# Patient Record
Sex: MALE | Race: WHITE | Employment: FULL TIME | URBAN - METROPOLITAN AREA
[De-identification: names, ages, dates, MRNs, and addresses within clinical notes are randomized per-mention and may not be internally consistent; named-entity substitution may affect disease eponyms.]

---

## 2018-06-05 ENCOUNTER — HOSPITAL ENCOUNTER (OUTPATIENT)
Dept: PHYSICAL THERAPY | Age: 53
Discharge: HOME | End: 2018-06-05
Payer: COMMERCIAL

## 2018-06-05 PROCEDURE — 97110 THERAPEUTIC EXERCISES: CPT | Performed by: PHYSICAL THERAPIST

## 2018-06-05 PROCEDURE — 97012 MECHANICAL TRACTION THERAPY: CPT | Performed by: PHYSICAL THERAPIST

## 2018-06-05 PROCEDURE — 97161 PT EVAL LOW COMPLEX 20 MIN: CPT | Performed by: PHYSICAL THERAPIST

## 2018-06-05 NOTE — PROGRESS NOTES
Ambulatory/Rehab Services H2 Model Falls Risk Assessment    Risk Factor Pts. ·   Confusion/Disorientation/Impulsivity  []    4 ·   Symptomatic Depression  []   2 ·   Altered Elimination  []   1 ·   Dizziness/Vertigo  []   1 ·   Gender (Male)  [x]   1 ·   Any administered antiepileptics (anticonvulsants):  []   2 ·   Any administered benzodiazepines:  []   1 ·   Visual Impairment (specify):  []   1 ·   Portable Oxygen Use  []   1 ·   Orthostatic ? BP  []   1 ·   History of Recent Falls (within 3 mos.)  []   5     Ability to Rise from Chair (choose one) Pts. ·   Ability to rise in a single movement  [x]   0 ·   Pushes up, successful in one attempt  []   1 ·   Multiple attempts, but successful  []   3 ·   Unable to rise without assistance  []   4   Total: (5 or greater = High Risk) 1     Falls Prevention Plan:   []                Physical Limitations to Exercise (specify):   []                Mobility Assistance Device (type):   []                Exercise/Equipment Adaptation (specify):    ©2010 [facility] [address] Patent #[id number].  Federal Law prohibits the replication, distribution or use without written permission from [facility]

## 2018-06-05 NOTE — THERAPY EVALUATION
Joana Dotson  : 1965 [address] at [facility] [address], [address], [facility], [id number] [place] [address] Road  [phone]   [phone]      Payor: [facility] / Plan: [facility] / Product Type: PPO /      OUTPATIENT PHYSICAL THERAPY:Initial Assessment and Daily Note 2018    ICD-10: Treatment Diagnosis: (M54.2) Cervicalgia; (M50.30) DDD Cervical; (M54.12) Cervical radiculopathy; (M62.81) muscle weakness  Precautions/Allergies:   Celebrex [celecoxib] and Pcn [penicillins]   Fall Risk Score: 1 (? 5 = High Risk)  MD Orders: Evaluate and Treat to include traction MEDICAL/REFERRING DIAGNOSIS:  Other cervical disc degeneration, unspecified cervical region [M50.30]  Radiculopathy, cervical region [M54.12]   DATE OF ONSET: 2018  REFERRING PHYSICIAN: Kaylee Krause,*  RETURN PHYSICIAN APPOINTMENT: After Therapy     INITIAL ASSESSMENT:  Mr. Jorje Russo presents with s/s consistent with the diagnosis of cervical DDD and radiculopathy. Upon evaluation he demonstrated restrictions in cervical spine AROM, as well as, intermittent R side pain and radiculopathy extending to his R AC joint. He reports pain is not constant, but can increase to a 6-7/10 at times. He scored a 12% disability rating on the NDI, however, he does wake at night due to pain. Pt will benefit from skilled PT to address the following deficits. PROBLEM LIST (Impacting functional limitations):  1. Decreased Strength  2. Increased Pain  3. Decreased Flexibility/Joint Mobility  4. Decreased Knowledge of Precautions  5. Decreased [place] with Home Exercise Program INTERVENTIONS PLANNED:  1. Cold  2. Electrical Stimulation  3. Heat  4. Home Exercise Program (HEP)  5. Manual Therapy  6. Neuromuscular Re-education/Strengthening  7. Range of Motion (ROM)  8. Therapeutic Exercise/Strengthening  9. Transcutaneous Electrical Nerve Stimulation (TENS)  10. Ultrasound (US)  11.  Mechanical Traction 12. Kinesiotaping   TREATMENT PLAN:  Effective Dates: 6/5/2018 TO 9/3/2018 (90 days). Frequency/Duration: 2 times a week for 90 Days  GOALS: (Goals have been discussed and agreed upon with patient.)  Short-Term Functional Goals: Time Frame: 4-6 weeks) (7-17-18)  1. Pt will be compliant and independent with HEP and demonstrating correct, erect posture without an increase in neck pain. 2. Pt will improve NDI score to 5/50 to increase pt's overall functional mobility. 3. Pt will subjectively report no longer waking during the night due to neck pain. 4. Pt to subjectively report a decrease in pain to 3/10 @ worst to increase pt's ease with driving and reading. 5. Pt will improve Cervical AROM to Cervical Flex: 75% and R Rotation 75% with pain 3/10 @ worst that occurs only on occasion. Discharge Goals: Time Frame: 8-12 weeks (9/3/18)  1. Pt will improve NDI score to < 4/50 to increase pt's overall functional mobility. 2. Pt will subjectively report little to no neck pain when turning his head L or R to allow him greater ease with performing his job in . 3. Pt will improve cervical AROM to > 80% all directions with min to no pain. 6. Pt will deny R superior shoulder pain with OH reaching/lifting. 4. Pt will subjectively report a decrease in pain to 1/10 @ worst to allow pt greater ease with reaching/working overhead. 5. Pt will be discharged from PT to [facility]. Rehabilitation Potential For Stated Goals: Good  Regarding [place] therapy, I certify that the treatment plan above will be carried out by a therapist or under their direction. Thank you for this referral,  Dipti Guerra, PT     Referring Physician Signature: Fang Fraser,*              Date                    The information in this section was collected on 6/5/18 (except where otherwise noted).   HISTORY:   History of Present Injury/Illness (Reason for Referral):  Pt reports he had no neck or R shoulder/UE pain until he was traveling out of town at with work and slept in a hotel. He states he woke up with a stiff neck, however, his pain proceeded to worsen later in the day when he got on a flight and lifted his carry on to put it in an overhead bin. He states at that time, his pain was a 7-8/10. He states his pain has improved, and is currently a 3/10. He reports turning his head to R and L increases his pain to a 6/10. He states MD ordered Vimovo (NSAID) that he will  from the pharmacy today. He denies taking any other meds or using headt/ice muscle rubs, massage, etc. He did purchase a new pillow, and feels it has helped some. Past Medical History/Comorbidities:   Mr. Emanuel He  has a past medical history of Hypertension; Obesity (BMI 30.0-34.9) (11/16/2016); Osteoarthritis; and White coat syndrome without diagnosis of hypertension. Mr. Emanuel He  has a past surgical history that includes pr total knee arthroplasty (Left, 01/2016). Social History/Living Environment:     Pt reports he lives in a 1 story home with his wife. He reports independence with ADLs. Prior Level of Function/Work/Activity:  Pt reports he still walks and plays golf for exercise. He denies that his pain has stopped him from doing so, He is in ,and drives ~ [id number], 5 days/week which is sometimes challenging on his painful days. Dominant Side:         RIGHT  Current Medications:       Current Outpatient Prescriptions:     naproxen sodium (ALEVE) 220 mg tablet, Take 440 mg by mouth as needed. Hold for surgery- pt reports last dose 11/16/16 @@ 0600, Disp: , Rfl:     acetaminophen (TYLENOL) 325 mg tablet, Take 650 mg by mouth every six (6) hours as needed for Pain., Disp: , Rfl:     atenolol-chlorthalidone (TENORETIC) 50-25 mg per tablet, Take 1 Tab by mouth every evening.  Indications: HYPERTENSION, Disp: , Rfl:    Date Last Reviewed:  6/5/18    Number of Personal Factors/Comorbidities that affect the Plan of Care:  0: LOW COMPLEXITY   EXAMINATION:   Observation/Orthostatic Postural Assessment:          Pt sits/stands with minimally forward head and rounded shoulders. He's somewhat guarded with cervical rotation AROM. Palpation:          Pt c/o increased pain with palpation to central and R side C5-C6, indicating the likelihood of a mechanical rotation and/or spurring present. Trigger points: none noted  Flexibility: Pec minor; UT; Levator Scap: min tightness throughout  Posture/Deformity:  Cervical AROM: % of normal motion in standing  Cervical spine Date:  6/5/18 Date:   Date:     Direction  Parameters Parameters Parameters   Flexion  50%; R pain     Extension  75% no pain     Rotation  R: 50% R pain  L: 75% no pain     Side bending  R: 75%  L:75% no pain     Shoulder Flex R:WFL  L:WFL     Shoulder ABD R:WFL  L:WFL     Shoulder ER/IR R:WFL  L:WFL  No shoulder pain with AROM today; in the past week, he's had R sup shoulder pain with OH activities         Strength Date:  6/5/18 Date:   Date:     Cervical/Shoulder  Parameters Parameters   Cervical Grossly 3+/5     Shoulder  Grossly 4/5 B        Myotomes: Normal and equal B throughout  Diaphragm (C4):  Deltoid/Biceps (C5):  Wrist Extensors (C6):  Triceps (C7):  Flexor Profundus (C8):    Sensation:  Normal and equal B throughout  Biceps (C5):   Alvarado Radial (C6-C7):  Alvarado Ulner (C8-T1):    Special Test/Function:  Compression: negative  Distraction:negative  Spurling's maneuver:negative  Accessory mobility:restricted C4-C6      Body Structures Involved:  1. Nerves  2. Bones  3. Joints  4. Muscles  5. Ligaments Body Functions Affected:  1. Sensory/Pain  2. Neuromusculoskeletal  3. Movement Related  Activities and Participation Affected:  1. Mobility  2. Self Care  3. Domestic Life  4.  Driving    Number of elements (examined above) that affect the Plan of Care:  1-2: LOW COMPLEXITY   CLINICAL PRESENTATION:    Presentation:  Stable and uncomplicated: LOW COMPLEXITY   CLINICAL DECISION MAKING:   Outcome Measure: Tool Used: Neck Disability Index (NDI)  Score:  Initial: 6/50=12% disability  Most Recent: X/50 (Date: -- )   Interpretation of Score: The Neck Disability Index is a revised form of the Oswestry Low Back Pain Index and is designed to measure the activities of daily living in person's with neck pain. Each section is scored on a 0-5 scale, 5 representing the greatest disability. The scores of each section are added together for a total score of 50. Score 0 1-10 11-20 21-30 31-40 41-49 50   Modifier CH CI CJ CK CL CM CN        Medical Necessity:   · Patient is expected to demonstrate progress in strength, range of motion, balance, coordination and functional technique to improve posture, pain and cervical AROM. [place] Dials Use of outcome tool(s) and clinical judgement create a POC that gives a: Clear prediction of patient's progress: LOW COMPLEXITY            TREATMENT:   (In addition to Assessment/Re-Assessment sessions the following treatments were rendered)  Pre-treatment Symptoms/Complaints:  Pt c/o central and R side cervical pain and stiffness. Pain: Initial:     3-4/10 Post Session:  3/10     THERAPEUTIC EXERCISE: (10 minutes):  Exercises per grid below to improve mobility, strength, balance and coordination. Required moderate visual, verbal, manual and tactile cues to promote proper body alignment, promote proper body posture, promote proper body mechanics and promote proper body breathing techniques. Progressed resistance, range, repetitions and complexity of movement as indicated. MECHANICAL TRACTION: (15 minutes): Traction was used due to the patient's cervical radiculopathy in order to relieve pain in or originating from his spine. Re-assessment was performed today (see above assessment section). Separate time was dedicated today for this re-assessment.   25minutes    Date:  6/5/18 Date:   Date:     Activity/Exercise Parameters Parameters Parameters   Posterior shoulder rolls 10x     Scapular retraction 10x     Shoulder shrugs 10x     UT stretch 2 x 30sec                 Education            Treatment/Session Assessment:  See above  · Response to Treatment:  Pt with improved R cervical rotation, as well as, decreased pain after mechanical traction. · Compliance with Program/Exercises: Will assess as treatment progresses. · Recommendations/Intent for next treatment session: \"Next visit will focus on advancements to more challenging activities, reduction in assistance provided and manual therapy/modaliites to improve joint mobility and soft tissue restrictions. \".     Future Appointments  Date Time Provider Renita Brush   6/8/2018 8:00 AM Leslie Salinas PT [facility]   6/12/2018 4:15 PM Leslie Salinas PT [facility]   6/15/2018 9:45 AM KAR MartinezOSRPT [facility]   6/19/2018 2:30 PM Karla ESPINOZAOSRPT [facility]   6/21/2018 4:00 PM Karla Hill [facility]       Total Treatment Duration: 50minutes  PT Patient Time In/Time Out  Time In: 1350  Time Out: [facility], PT

## 2018-06-08 ENCOUNTER — HOSPITAL ENCOUNTER (OUTPATIENT)
Dept: PHYSICAL THERAPY | Age: 53
Discharge: HOME | End: 2018-06-08
Payer: COMMERCIAL

## 2018-06-08 PROCEDURE — 97012 MECHANICAL TRACTION THERAPY: CPT | Performed by: PHYSICAL THERAPIST

## 2018-06-08 PROCEDURE — 97035 APP MDLTY 1+ULTRASOUND EA 15: CPT | Performed by: PHYSICAL THERAPIST

## 2018-06-08 PROCEDURE — 97140 MANUAL THERAPY 1/> REGIONS: CPT | Performed by: PHYSICAL THERAPIST

## 2018-06-08 NOTE — PROGRESS NOTES
Rommel Lizama  : 1965 [id number] at [address] [facility]. [facility] [address], [place], [address] [address]  [phone]   [phone]      Payor: [facility] / Plan: [address] / Product Type: PPO /      OUTPATIENT PHYSICAL [address] Espinoza Note 2018    ICD-10: Treatment Diagnosis: (M54.2) Cervicalgia; (M50.30) DDD Cervical; (M54.12) Cervical radiculopathy; (M62.81) muscle weakness  Precautions/Allergies:   Celebrex [celecoxib] and Pcn [penicillins]   Fall Risk Score: 1 (? 5 = High Risk)  MD Orders: Evaluate and Treat to include traction MEDICAL/REFERRING DIAGNOSIS:  Other cervical disc degeneration, unspecified cervical region [M50.30]  Radiculopathy, cervical region [M54.12]   DATE OF ONSET: 2018  REFERRING PHYSICIAN: Dru England,*  RETURN PHYSICIAN APPOINTMENT: After Therapy     INITIAL ASSESSMENT:  Mr. Carmela Daly presents with s/s consistent with the diagnosis of cervical DDD and radiculopathy. Upon evaluation he demonstrated restrictions in cervical spine AROM, as well as, intermittent R side pain and radiculopathy extending to his R AC joint. He reports pain is not constant, but can increase to a 6-7/10 at times. He scored a 12% disability rating on the NDI, however, he does wake at night due to pain. Pt will benefit from skilled PT to address the following deficits. PROBLEM LIST (Impacting functional limitations):  1. Decreased Strength  2. Increased Pain  3. Decreased Flexibility/Joint Mobility  4. Decreased Knowledge of Precautions  5. Decreased [place] with Home Exercise Program INTERVENTIONS PLANNED:  1. Cold  2. Electrical Stimulation  3. Heat  4. Home Exercise Program (HEP)  5. Manual Therapy  6. Neuromuscular Re-education/Strengthening  7. Range of Motion (ROM)  8. Therapeutic Exercise/Strengthening  9. Transcutaneous Electrical Nerve Stimulation (TENS)  10. Ultrasound (US)  11. Mechanical Traction   12.  Kinesiotaping TREATMENT PLAN:  Effective Dates: 6/5/2018 TO 9/3/2018 (90 days). Frequency/Duration: 2 times a week for 90 Days  GOALS: (Goals have been discussed and agreed upon with patient.)  Short-Term Functional Goals: Time Frame: 4-6 weeks) (7-17-18)  1. Pt will be compliant and independent with HEP and demonstrating correct, erect posture without an increase in neck pain. 2. Pt will improve NDI score to 5/50 to increase pt's overall functional mobility. 3. Pt will subjectively report no longer waking during the night due to neck pain. 4. Pt to subjectively report a decrease in pain to 3/10 @ worst to increase pt's ease with driving and reading. 5. Pt will improve Cervical AROM to Cervical Flex: 75% and R Rotation 75% with pain 3/10 @ worst that occurs only on occasion. Discharge Goals: Time Frame: 8-12 weeks (9/3/18)  1. Pt will improve NDI score to < 4/50 to increase pt's overall functional mobility. 2. Pt will subjectively report little to no neck pain when turning his head L or R to allow him greater ease with performing his job in . 3. Pt will improve cervical AROM to > 80% all directions with min to no pain. 6. Pt will deny R superior shoulder pain with OH reaching/lifting. 4. Pt will subjectively report a decrease in pain to 1/10 @ worst to allow pt greater ease with reaching/working overhead. 5. Pt will be discharged from PT to [facility]. Rehabilitation Potential For Stated Goals: Good  Regarding [place] therapy, I certify that the treatment plan above will be carried out by a therapist or under their direction. Thank you for this referral,  Kalyn Ignacio PT                 The information in this section was collected on 6/5/18 (except where otherwise noted). HISTORY:   History of Present Injury/Illness (Reason for Referral):  Pt reports he had no neck or R shoulder/UE pain until he was traveling out of town at with work and slept in a hotel.  He states he woke up with a stiff neck, however, his pain proceeded to worsen later in the day when he got on a flight and lifted his carry on to put it in an overhead bin. He states at that time, his pain was a 7-8/10. He states his pain has improved, and is currently a 3/10. He reports turning his head to R and L increases his pain to a 6/10. He states MD ordered Vimovo (NSAID) that he will  from the pharmacy today. He denies taking any other meds or using headt/ice muscle rubs, massage, etc. He did purchase a new pillow, and feels it has helped some. Past Medical History/Comorbidities:   Mr. Leona Wright  has a past medical history of Hypertension; Obesity (BMI 30.0-34.9) (11/16/2016); Osteoarthritis; and White coat syndrome without diagnosis of hypertension. Mr. Leona Wright  has a past surgical history that includes pr total knee arthroplasty (Left, 01/2016). Social History/Living Environment:     Pt reports he lives in a 1 story home with his wife. He reports independence with ADLs. Prior Level of Function/Work/Activity:  Pt reports he still walks and plays golf for exercise. He denies that his pain has stopped him from doing so, He is in ,and drives ~ [id number], 5 days/week which is sometimes challenging on his painful days. Dominant Side:         RIGHT  Current Medications:       Current Outpatient Prescriptions:     naproxen sodium (ALEVE) 220 mg tablet, Take 440 mg by mouth as needed. Hold for surgery- pt reports last dose 11/16/16 @@ 0600, Disp: , Rfl:     acetaminophen (TYLENOL) 325 mg tablet, Take 650 mg by mouth every six (6) hours as needed for Pain., Disp: , Rfl:     atenolol-chlorthalidone (TENORETIC) 50-25 mg per tablet, Take 1 Tab by mouth every evening. Indications: HYPERTENSION, Disp: , Rfl:    Date Last Reviewed:  6/5/18   EXAMINATION:   Observation/Orthostatic Postural Assessment:          Pt sits/stands with minimally forward head and rounded shoulders.  He's somewhat guarded with cervical rotation AROM.  Palpation:          Pt c/o increased pain with palpation to central and R side C5-C6, indicating the likelihood of a mechanical rotation and/or spurring present. Trigger points: none noted  Flexibility: Pec minor; UT; Levator Scap: min tightness throughout  Posture/Deformity:  Cervical AROM: % of normal motion in standing  Cervical spine Date:  6/5/18 Date:   Date:     Direction  Parameters Parameters Parameters   Flexion  50%; R pain     Extension  75% no pain     Rotation  R: 50% R pain  L: 75% no pain     Side bending  R: 75%  L:75% no pain     Shoulder Flex R:WFL  L:WFL     Shoulder ABD R:WFL  L:WFL     Shoulder ER/IR R:WFL  L:WFL  No shoulder pain with AROM today; in the past week, he's had R sup shoulder pain with OH activities         Strength Date:  6/5/18 Date:   Date:     Cervical/Shoulder  Parameters Parameters   Cervical Grossly 3+/5     Shoulder  Grossly 4/5 B        Myotomes: Normal and equal B throughout  Diaphragm (C4):  Deltoid/Biceps (C5):  Wrist Extensors (C6):  Triceps (C7):  Flexor Profundus (C8):    Sensation:  Normal and equal B throughout  Biceps (C5):   Alvarado Radial (C6-C7):  Alvarado Ulner (C8-T1):    Special Test/Function:  Compression: negative  Distraction:negative  Spurling's maneuver:negative  Accessory mobility:restricted C4-C6      Body Structures Involved:  1. Nerves  2. Bones  3. Joints  4. Muscles  5. Ligaments Body Functions Affected:  1. Sensory/Pain  2. Neuromusculoskeletal  3. Movement Related  Activities and Participation Affected:  1. Mobility  2. Self Care  3. Domestic Life  4. Driving   CLINICAL PRESENTATION:   CLINICAL DECISION MAKING:   Outcome Measure: Tool Used: Neck Disability Index (NDI)  Score:  Initial: 6/50=12% disability  Most Recent: X/50 (Date: -- )   Interpretation of Score: The Neck Disability Index is a revised form of the Oswestry Low Back Pain Index and is designed to measure the activities of daily living in person's with neck pain.   Each section is scored on a 0-5 scale, 5 representing the greatest disability. The scores of each section are added together for a total score of 50. Score 0 1-10 11-20 21-30 31-40 41-49 50   Modifier CH CI CJ CK CL CM CN        Medical Necessity:   · Patient is expected to demonstrate progress in strength, range of motion, balance, coordination and functional technique to improve posture, pain and cervical AROM. [place] Dials TREATMENT:   (In addition to Assessment/Re-Assessment sessions the following treatments were rendered)  Pre-treatment Symptoms/Complaints:  Pt reports significantly less cervical pain and improved mobility after cervical traction last visit. Pain: Initial:     2-3/10 Post Session:  1/10     THERAPEUTIC EXERCISE: (0 minutes):  Exercises per grid below to improve mobility, strength, balance and coordination. Required moderate visual, verbal, manual and tactile cues to promote proper body alignment, promote proper body posture, promote proper body mechanics and promote proper body breathing techniques. Progressed resistance, range, repetitions and complexity of movement as indicated. ULTRASOUND: (10minutes): @ 3.3MHz, 100%, 1.0w/cm2 to B cervical paraspinals, B UT, and B levator scap to aide with decreasing muscle tightness and improving tissue mobility,     MANUAL THERAPY: (15minutes): Consisting of STM/MFR to B cervical paraspinals, B UT, B levator scap to aide with improving tissue mobility while decreasing pain. MECHANICAL TRACTION: (20 minutes): Static @ 50% speed, 2 steps, (27#/12#). Traction was used due to the patient's cervical radiculopathy in order to relieve pain in or originating from his spine. KINESIOTAPING: (5minutes): to inhibit B cervical paraspinals, (\"Y\" strip 25% pull) and B UT (2 \"I\" strips 50% pull) to aide with decreasing muscle/tissue tightness/pain.       Date:  6/5/18 Date:   Date:     Activity/Exercise Parameters Parameters Parameters   Posterior shoulder rolls 10x     Scapular retraction 10x     Shoulder shrugs 10x     UT stretch 2 x 30sec                 Education            Treatment/Session Assessment:  Pt with tightness noted in R cervical paraspinals, R UT, and R levator scap. Palpably tender at R side C4-C5. · Response to Treatment:  Pt with improved R cervical rotation, as well as, decreased pain after mechanical traction, US, manual therapy and kinesiotaping. · Compliance with Program/Exercises: Pt reports daily compliance with above postural exercises and stretching. · Recommendations/Intent for next treatment session: Continue PT 2x/week to increase cervical AROM while decreasing pain and muscle tightness to improve pt's posture and overall functional mobility.      Future Appointments  Date Time Provider Renita Brush   6/12/2018 4:15 PM Damon Mckay PT [facility]   6/15/2018 9:45 AM Damon Mckay PT [facility]   6/19/2018 2:30 PM Karla Louis [facility]   6/21/2018 4:00 PM Karla Cabans [id number] [facility]       Total Treatment Duration: 50minutes  PT Patient Time In/Time Out  Time In: 0810  Time Out: Julita Sun, PT

## 2018-06-12 ENCOUNTER — HOSPITAL ENCOUNTER (OUTPATIENT)
Dept: PHYSICAL THERAPY | Age: 53
Discharge: HOME | End: 2018-06-12
Payer: COMMERCIAL

## 2018-06-12 PROCEDURE — 97012 MECHANICAL TRACTION THERAPY: CPT | Performed by: PHYSICAL THERAPIST

## 2018-06-12 PROCEDURE — 97140 MANUAL THERAPY 1/> REGIONS: CPT | Performed by: PHYSICAL THERAPIST

## 2018-06-12 PROCEDURE — 97035 APP MDLTY 1+ULTRASOUND EA 15: CPT | Performed by: PHYSICAL THERAPIST

## 2018-06-12 NOTE — PROGRESS NOTES
Jefry Gonzalez  : 1965 [address] at [address]. [facility] [address], [place], [address] [address]  [phone]   [phone]      Payor: [facility] / Plan: [address] / Product Type: PPO /      OUTPATIENT PHYSICAL [address] Note 2018    ICD-10: Treatment Diagnosis: (M54.2) Cervicalgia; (M50.30) DDD Cervical; (M54.12) Cervical radiculopathy; (M62.81) muscle weakness  Precautions/Allergies:   Celebrex [celecoxib] and Pcn [penicillins]   Fall Risk Score: 1 (? 5 = High Risk)  MD Orders: Evaluate and Treat to include traction MEDICAL/REFERRING DIAGNOSIS:  Other cervical disc degeneration, unspecified cervical region [M50.30]  Radiculopathy, cervical region [M54.12]   DATE OF ONSET: 2018  REFERRING PHYSICIAN: Fang Fraser,*  RETURN PHYSICIAN APPOINTMENT: After Therapy     INITIAL ASSESSMENT:  Mr. Leveda Landau presents with s/s consistent with the diagnosis of cervical DDD and radiculopathy. Upon evaluation he demonstrated restrictions in cervical spine AROM, as well as, intermittent R side pain and radiculopathy extending to his R AC joint. He reports pain is not constant, but can increase to a 6-7/10 at times. He scored a 12% disability rating on the NDI, however, he does wake at night due to pain. Pt will benefit from skilled PT to address the following deficits. PROBLEM LIST (Impacting functional limitations):  1. Decreased Strength  2. Increased Pain  3. Decreased Flexibility/Joint Mobility  4. Decreased Knowledge of Precautions  5. Decreased [place] with Home Exercise Program INTERVENTIONS PLANNED:  1. Cold  2. Electrical Stimulation  3. Heat  4. Home Exercise Program (HEP)  5. Manual Therapy  6. Neuromuscular Re-education/Strengthening  7. Range of Motion (ROM)  8. Therapeutic Exercise/Strengthening  9. Transcutaneous Electrical Nerve Stimulation (TENS)  10. Ultrasound (US)  11. Mechanical Traction   12.  Kinesiotaping TREATMENT PLAN:  Effective Dates: 6/5/2018 TO 9/3/2018 (90 days). Frequency/Duration: 2 times a week for 90 Days  GOALS: (Goals have been discussed and agreed upon with patient.)  Short-Term Functional Goals: Time Frame: 4-6 weeks) (7-17-18)  1. Pt will be compliant and independent with HEP and demonstrating correct, erect posture without an increase in neck pain. 2. Pt will improve NDI score to 5/50 to increase pt's overall functional mobility. 3. Pt will subjectively report no longer waking during the night due to neck pain. 4. Pt to subjectively report a decrease in pain to 3/10 @ worst to increase pt's ease with driving and reading. 5. Pt will improve Cervical AROM to Cervical Flex: 75% and R Rotation 75% with pain 3/10 @ worst that occurs only on occasion. Discharge Goals: Time Frame: 8-12 weeks (9/3/18)  1. Pt will improve NDI score to < 4/50 to increase pt's overall functional mobility. 2. Pt will subjectively report little to no neck pain when turning his head L or R to allow him greater ease with performing his job in . 3. Pt will improve cervical AROM to > 80% all directions with min to no pain. 6. Pt will deny R superior shoulder pain with OH reaching/lifting. 4. Pt will subjectively report a decrease in pain to 1/10 @ worst to allow pt greater ease with reaching/working overhead. 5. Pt will be discharged from PT to [facility]. Rehabilitation Potential For Stated Goals: Good  Regarding Leoncio Zendejasholly therapy, I certify that the treatment plan above will be carried out by a therapist or under their direction. Thank you for this referral,  Roz Iglesias PT                 The information in this section was collected on 6/5/18 (except where otherwise noted). HISTORY:   History of Present Injury/Illness (Reason for Referral):  Pt reports he had no neck or R shoulder/UE pain until he was traveling out of town at with work and slept in a hotel.  He states he woke up with a stiff neck, however, his pain proceeded to worsen later in the day when he got on a flight and lifted his carry on to put it in an overhead bin. He states at that time, his pain was a 7-8/10. He states his pain has improved, and is currently a 3/10. He reports turning his head to R and L increases his pain to a 6/10. He states MD ordered Vimovo (NSAID) that he will  from the pharmacy today. He denies taking any other meds or using headt/ice muscle rubs, massage, etc. He did purchase a new pillow, and feels it has helped some. Past Medical History/Comorbidities:   Mr. Adelita Foster  has a past medical history of Hypertension; Obesity (BMI 30.0-34.9) (11/16/2016); Osteoarthritis; and White coat syndrome without diagnosis of hypertension. Mr. Adelita Foster  has a past surgical history that includes pr total knee arthroplasty (Left, 01/2016). Social History/Living Environment:     Pt reports he lives in a 1 story home with his wife. He reports independence with ADLs. Prior Level of Function/Work/Activity:  Pt reports he still walks and plays golf for exercise. He denies that his pain has stopped him from doing so, He is in ,and drives ~ [address], 5 days/week which is sometimes challenging on his painful days. Dominant Side:         RIGHT  Current Medications:       Current Outpatient Prescriptions:     naproxen sodium (ALEVE) 220 mg tablet, Take 440 mg by mouth as needed. Hold for surgery- pt reports last dose 11/16/16 @@ 0600, Disp: , Rfl:     acetaminophen (TYLENOL) 325 mg tablet, Take 650 mg by mouth every six (6) hours as needed for Pain., Disp: , Rfl:     atenolol-chlorthalidone (TENORETIC) 50-25 mg per tablet, Take 1 Tab by mouth every evening. Indications: HYPERTENSION, Disp: , Rfl:    Date Last Reviewed:  6/5/18   EXAMINATION:   Observation/Orthostatic Postural Assessment:          Pt sits/stands with minimally forward head and rounded shoulders.  He's somewhat guarded with cervical rotation AROM.  Palpation:          Pt c/o increased pain with palpation to central and R side C5-C6, indicating the likelihood of a mechanical rotation and/or spurring present. Trigger points: none noted  Flexibility: Pec minor; UT; Levator Scap: min tightness throughout  Posture/Deformity:  Cervical AROM: % of normal motion in standing  Cervical spine Date:  6/5/18 Date:   Date:     Direction  Parameters Parameters Parameters   Flexion  50%; R pain     Extension  75% no pain     Rotation  R: 50% R pain  L: 75% no pain     Side bending  R: 75%  L:75% no pain     Shoulder Flex R:WFL  L:WFL     Shoulder ABD R:WFL  L:WFL     Shoulder ER/IR R:WFL  L:WFL  No shoulder pain with AROM today; in the past week, he's had R sup shoulder pain with OH activities         Strength Date:  6/5/18 Date:   Date:     Cervical/Shoulder  Parameters Parameters   Cervical Grossly 3+/5     Shoulder  Grossly 4/5 B        Myotomes: Normal and equal B throughout  Diaphragm (C4):  Deltoid/Biceps (C5):  Wrist Extensors (C6):  Triceps (C7):  Flexor Profundus (C8):    Sensation:  Normal and equal B throughout  Biceps (C5):   Alvarado Radial (C6-C7):  Alvarado Ulner (C8-T1):    Special Test/Function:  Compression: negative  Distraction:negative  Spurling's maneuver:negative  Accessory mobility:restricted C4-C6      Body Structures Involved:  1. Nerves  2. Bones  3. Joints  4. Muscles  5. Ligaments Body Functions Affected:  1. Sensory/Pain  2. Neuromusculoskeletal  3. Movement Related  Activities and Participation Affected:  1. Mobility  2. Self Care  3. Domestic Life  4. Driving   CLINICAL PRESENTATION:   CLINICAL DECISION MAKING:   Outcome Measure: Tool Used: Neck Disability Index (NDI)  Score:  Initial: 6/50=12% disability  Most Recent: X/50 (Date: -- )   Interpretation of Score: The Neck Disability Index is a revised form of the Oswestry Low Back Pain Index and is designed to measure the activities of daily living in person's with neck pain.   Each section is scored on a 0-5 scale, 5 representing the greatest disability. The scores of each section are added together for a total score of 50. Score 0 1-10 11-20 21-30 31-40 41-49 50   Modifier CH CI CJ CK CL CM CN        Medical Necessity:   · Patient is expected to demonstrate progress in strength, range of motion, balance, coordination and functional technique to improve posture, pain and cervical AROM. Michaelyn Daily TREATMENT:   (In addition to Assessment/Re-Assessment sessions the following treatments were rendered)  Pre-treatment Symptoms/Complaints:  Pt reports he had a couple of occurrences of sharp pain shooting across his R shoulder when neck was flexed and rotated right to look down at his phone. He reports overall, his pain and motion are improving. Pain: Initial:     3/10 Post Session:  1/10     THERAPEUTIC EXERCISE: (0 minutes):  Exercises per grid below to improve mobility, strength, balance and coordination. Required moderate visual, verbal, manual and tactile cues to promote proper body alignment, promote proper body posture, promote proper body mechanics and promote proper body breathing techniques. Progressed resistance, range, repetitions and complexity of movement as indicated. ULTRASOUND: (10minutes): @ 3.3MHz, 100%, 1.0w/cm2 to B cervical paraspinals, B UT, and B levator scap to aide with decreasing muscle tightness and improving tissue mobility,     MANUAL THERAPY: (15minutes): Consisting of STM/MFR to B cervical paraspinals, B UT, B levator scap and R anterior scalenes, as well as, trigger point release to R UT to aide with improving tissue mobility while decreasing pain. MECHANICAL TRACTION: (15minutes): Static @ 50% speed, 2 steps, (28#/10#). Traction was used due to the patient's cervical radiculopathy in order to relieve pain in or originating from his spine.     KINESIOTAPING: (0minutes): to inhibit B cervical paraspinals, (\"Y\" strip 25% pull) and B UT (2 \"I\" strips 50% pull) to aide with decreasing muscle/tissue tightness/pain. Date:  6/5/18 Date:   Date:     Activity/Exercise Parameters Parameters Parameters   Posterior shoulder rolls 10x     Scapular retraction 10x     Shoulder shrugs 10x     UT stretch 2 x 30sec                 Education            Treatment/Session Assessment:  R UT and anterior scalene tightness continues. He was able to tolerate a trigger point release in R UT today, as well as, increased poundage pull with traction with no increase in pain. Pt is progressing nicely toward current goals. · Response to Treatment:  Pt with improved R cervical rotation, as well as, decreased pain after mechanical traction, US, manual therapy and kinesiotaping. · Compliance with Program/Exercises: Pt reports daily compliance with above postural exercises and stretching. · Recommendations/Intent for next treatment session: Continue PT 2x/week to increase cervical AROM while decreasing pain and muscle tightness to improve pt's posture and overall functional mobility. Try postural strengthening later this week.      Future Appointments  Date Time Provider Renita Brush   6/15/2018 9:45 AM KAR Hernandez [facility]   [facility]   6/21/2018 4:00 PM Karla Bosch OST [facility]       Total Treatment Duration: 40minutes  PT Patient Time In/Time Out  Time In: 1625  Time Out: [address] PT

## 2018-06-15 ENCOUNTER — HOSPITAL ENCOUNTER (OUTPATIENT)
Dept: PHYSICAL THERAPY | Age: 53
Discharge: HOME | End: 2018-06-15
Payer: COMMERCIAL

## 2018-06-15 PROCEDURE — 97110 THERAPEUTIC EXERCISES: CPT | Performed by: PHYSICAL THERAPIST

## 2018-06-15 PROCEDURE — 97140 MANUAL THERAPY 1/> REGIONS: CPT | Performed by: PHYSICAL THERAPIST

## 2018-06-15 PROCEDURE — 97035 APP MDLTY 1+ULTRASOUND EA 15: CPT | Performed by: PHYSICAL THERAPIST

## 2018-06-15 PROCEDURE — 97012 MECHANICAL TRACTION THERAPY: CPT | Performed by: PHYSICAL THERAPIST

## 2018-06-15 NOTE — PROGRESS NOTES
Julio C Senior  : 1965 [address]. [facility] [address], [place], [address]  [phone]   [phone]      Payor: [facility] / Plan: [address] / Product Type: PPO /      OUTPATIENT PHYSICAL [address] [facility] Note 6/15/2018    ICD-10: Treatment Diagnosis: (M54.2) Cervicalgia; (M50.30) DDD Cervical; (M54.12) Cervical radiculopathy; (M62.81) muscle weakness  Precautions/Allergies:   Celebrex [celecoxib] and Pcn [penicillins]   Fall Risk Score: 1 (? 5 = High Risk)  MD Orders: Evaluate and Treat to include traction MEDICAL/REFERRING DIAGNOSIS:  Other cervical disc degeneration, unspecified cervical region [M50.30]  Radiculopathy, cervical region [M54.12]   DATE OF ONSET: 2018  REFERRING PHYSICIAN: Denise Goddard,*  RETURN PHYSICIAN APPOINTMENT: After Therapy     INITIAL ASSESSMENT:  Mr. Sheila Lowry presents with s/s consistent with the diagnosis of cervical DDD and radiculopathy. Upon evaluation he demonstrated restrictions in cervical spine AROM, as well as, intermittent R side pain and radiculopathy extending to his R AC joint. He reports pain is not constant, but can increase to a 6-7/10 at times. He scored a 12% disability rating on the NDI, however, he does wake at night due to pain. Pt will benefit from skilled PT to address the following deficits. PROBLEM LIST (Impacting functional limitations):  1. Decreased Strength  2. Increased Pain  3. Decreased Flexibility/Joint Mobility  4. Decreased Knowledge of Precautions  5. Decreased [place] with Home Exercise Program INTERVENTIONS PLANNED:  1. Cold  2. Electrical Stimulation  3. Heat  4. Home Exercise Program (HEP)  5. Manual Therapy  6. Neuromuscular Re-education/Strengthening  7. Range of Motion (ROM)  8. Therapeutic Exercise/Strengthening  9. Transcutaneous Electrical Nerve Stimulation (TENS)  10. Ultrasound (US)  11. Mechanical Traction   12.  Kinesiotaping TREATMENT PLAN:  Effective Dates: 6/5/2018 TO 9/3/2018 (90 days). Frequency/Duration: 2 times a week for 90 Days  GOALS: (Goals have been discussed and agreed upon with patient.)  Short-Term Functional Goals: Time Frame: 4-6 weeks) (7-17-18)  1. Pt will be compliant and independent with HEP and demonstrating correct, erect posture without an increase in neck pain. 2. Pt will improve NDI score to 5/50 to increase pt's overall functional mobility. 3. Pt will subjectively report no longer waking during the night due to neck pain. 4. Pt to subjectively report a decrease in pain to 3/10 @ worst to increase pt's ease with driving and reading. 5. Pt will improve Cervical AROM to Cervical Flex: 75% and R Rotation 75% with pain 3/10 @ worst that occurs only on occasion. Discharge Goals: Time Frame: 8-12 weeks (9/3/18)  1. Pt will improve NDI score to < 4/50 to increase pt's overall functional mobility. 2. Pt will subjectively report little to no neck pain when turning his head L or R to allow him greater ease with performing his job in . 3. Pt will improve cervical AROM to > 80% all directions with min to no pain. 6. Pt will deny R superior shoulder pain with OH reaching/lifting. 4. Pt will subjectively report a decrease in pain to 1/10 @ worst to allow pt greater ease with reaching/working overhead. 5. Pt will be discharged from PT to [facility]. Rehabilitation Potential For Stated Goals: Good  Regarding Mayer Mean therapy, I certify that the treatment plan above will be carried out by a therapist or under their direction. Thank you for this referral,  Dipti Guerra PT                 The information in this section was collected on 6/5/18 (except where otherwise noted). HISTORY:   History of Present Injury/Illness (Reason for Referral):  Pt reports he had no neck or R shoulder/UE pain until he was traveling out of town at with work and slept in a hotel.  He states he woke up with a stiff neck, however, his pain proceeded to worsen later in the day when he got on a flight and lifted his carry on to put it in an overhead bin. He states at that time, his pain was a 7-8/10. He states his pain has improved, and is currently a 3/10. He reports turning his head to R and L increases his pain to a 6/10. He states MD ordered Vimovo (NSAID) that he will  from the pharmacy today. He denies taking any other meds or using headt/ice muscle rubs, massage, etc. He did purchase a new pillow, and feels it has helped some. Past Medical History/Comorbidities:   Mr. Candance Pronto  has a past medical history of Hypertension; Obesity (BMI 30.0-34.9) (11/16/2016); Osteoarthritis; and White coat syndrome without diagnosis of hypertension. Mr. Candance Pronto  has a past surgical history that includes pr total knee arthroplasty (Left, 01/2016). Social History/Living Environment:     Pt reports he lives in a 1 story home with his wife. He reports independence with ADLs. Prior Level of Function/Work/Activity:  Pt reports he still walks and plays golf for exercise. He denies that his pain has stopped him from doing so, He is in ,and drives ~ [address], 5 days/week which is sometimes challenging on his painful days. Dominant Side:         RIGHT  Current Medications:       Current Outpatient Prescriptions:     naproxen sodium (ALEVE) 220 mg tablet, Take 440 mg by mouth as needed. Hold for surgery- pt reports last dose 11/16/16 @@ 0600, Disp: , Rfl:     acetaminophen (TYLENOL) 325 mg tablet, Take 650 mg by mouth every six (6) hours as needed for Pain., Disp: , Rfl:     atenolol-chlorthalidone (TENORETIC) 50-25 mg per tablet, Take 1 Tab by mouth every evening. Indications: HYPERTENSION, Disp: , Rfl:    Date Last Reviewed:  6/5/18   EXAMINATION:   Observation/Orthostatic Postural Assessment:          Pt sits/stands with minimally forward head and rounded shoulders.  He's somewhat guarded with cervical rotation AROM.  Palpation:          Pt c/o increased pain with palpation to central and R side C5-C6, indicating the likelihood of a mechanical rotation and/or spurring present. Trigger points: none noted  Flexibility: Pec minor; UT; Levator Scap: min tightness throughout  Posture/Deformity:  Cervical AROM: % of normal motion in standing  Cervical spine Date:  6/5/18 Date:   Date:     Direction  Parameters Parameters Parameters   Flexion  50%; R pain     Extension  75% no pain     Rotation  R: 50% R pain  L: 75% no pain     Side bending  R: 75%  L:75% no pain     Shoulder Flex R:WFL  L:WFL     Shoulder ABD R:WFL  L:WFL     Shoulder ER/IR R:WFL  L:WFL  No shoulder pain with AROM today; in the past week, he's had R sup shoulder pain with OH activities         Strength Date:  6/5/18 Date:   Date:     Cervical/Shoulder  Parameters Parameters   Cervical Grossly 3+/5     Shoulder  Grossly 4/5 B        Myotomes: Normal and equal B throughout  Diaphragm (C4):  Deltoid/Biceps (C5):  Wrist Extensors (C6):  Triceps (C7):  Flexor Profundus (C8):    Sensation:  Normal and equal B throughout  Biceps (C5):   Alvarado Radial (C6-C7):  Alvarado Ulner (C8-T1):    Special Test/Function:  Compression: negative  Distraction:negative  Spurling's maneuver:negative  Accessory mobility:restricted C4-C6      Body Structures Involved:  1. Nerves  2. Bones  3. Joints  4. Muscles  5. Ligaments Body Functions Affected:  1. Sensory/Pain  2. Neuromusculoskeletal  3. Movement Related  Activities and Participation Affected:  1. Mobility  2. Self Care  3. Domestic Life  4. Driving   CLINICAL PRESENTATION:   CLINICAL DECISION MAKING:   Outcome Measure: Tool Used: Neck Disability Index (NDI)  Score:  Initial: 6/50=12% disability  Most Recent: X/50 (Date: -- )   Interpretation of Score: The Neck Disability Index is a revised form of the Oswestry Low Back Pain Index and is designed to measure the activities of daily living in person's with neck pain.   Each section is scored on a 0-5 scale, 5 representing the greatest disability. The scores of each section are added together for a total score of 50. Score 0 1-10 11-20 21-30 31-40 41-49 50   Modifier CH CI CJ CK CL CM CN        Medical Necessity:   · Patient is expected to demonstrate progress in strength, range of motion, balance, coordination and functional technique to improve posture, pain and cervical AROM. Cary Fiore TREATMENT:   (In addition to Assessment/Re-Assessment sessions the following treatments were rendered)  Pre-treatment Symptoms/Complaints:  Pt reports less neck pain and improved mobility this week. He states he still has ~ 3-4 incidences of radicular symptoms/week, and one  occurred last pm while sitting in his recliner after playing golf and working all day. He reports the kinesiotape felt good while working, but states he feels it restricts him when trying to play golf. Pain: Initial:     2/10 Post Session:  0/10     THERAPEUTIC EXERCISE: (10 minutes):  Exercises per grid below to improve mobility, strength, balance and coordination. Required moderate visual, verbal, manual and tactile cues to promote proper body alignment, promote proper body posture, promote proper body mechanics and promote proper body breathing techniques. Progressed resistance, range, repetitions and complexity of movement as indicated. ULTRASOUND: (10minutes): @ 3.3MHz, 100%, 1.0w/cm2 to B cervical paraspinals, B UT, and B levator scap to aide with decreasing muscle tightness and improving tissue mobility,     MANUAL THERAPY: (20minutes): Consisting of STM/MFR to B cervical paraspinals, B UT, B levator scap and R anterior scalenes, as well as, trigger point release to R UT to aide with improving tissue mobility while decreasing pain. MECHANICAL TRACTION: (15minutes): Static @ 50% speed, 2 steps, (29#/12#).  Traction was used due to the patient's cervical radiculopathy in order to relieve pain in or originating from his spine. KINESIOTAPING: (0minutes): to inhibit B cervical paraspinals, (\"Y\" strip 25% pull) and B UT (2 \"I\" strips 50% pull) to aide with decreasing muscle/tissue tightness/pain. Date:  6/5/18 Date:  6/15/18 Date:     Activity/Exercise Parameters Parameters Parameters   Posterior shoulder rolls 10x 15x    Scapular retraction 10x 15x    Shoulder shrugs 10x 15x    UT stretch 2 x 30sec     Sitting and Supine cervical retraction w/ chin tuck/head lift  10x each    Stdg drwy pec stretch  3 x 20sec B    Education  HEP posture while driving and working on computer          Treatment/Session Assessment:  R side cervical paraspinals, R UT, R levator scap remain tight, but are improving. Trigger point release required in R UT, however, pt was able to tolerate with ease. Pt was also able to tolerate increased poundage pull on mechanical traction without adverse affects. Overall, pt is progressing toward current goals. · Response to Treatment:  Pt with improved cervical mobility, especially R rotation after treatment. Kinesiotaping deferred today as he plans to play golf several times over the weekend. · Compliance with Program/Exercises: Pt reports daily compliance with above postural exercises and stretching. · Recommendations/Intent for next treatment session: Continue PT 2x/week to increase cervical AROM while decreasing pain and muscle tightness to improve pt's posture and overall functional mobility. Progress postural strengthening as pt is able.      Future Appointments  Date Time Provider Renita Brush   6/19/2018 2:30 PM Fadia Lanza [facility]   6/21/2018 4:00 PM Karla Cameron [facility]   6/26/2018 3:30 PM KAR [facility]   6/29/2018 9:30 AM KAR [facility]ara [facility]   7/3/2018 3:30 PM KAR Ferrara [facility]   7/6/2018 9:30 AM KAR Ferrara [facility]   7/10/2018 3:30 PM KAR Ferrara [facility]   7/13/2018 9:30 AM Kyle Clayton PT [facility]       Total Treatment Duration: 55minutes  PT Patient Time In/Time Out  Time In: 0940  Time Out: 1530 [address] Kolby Arriaza PT

## 2018-06-19 ENCOUNTER — HOSPITAL ENCOUNTER (OUTPATIENT)
Dept: PHYSICAL THERAPY | Age: 53
Discharge: HOME | End: 2018-06-19
Payer: COMMERCIAL

## 2018-06-19 PROCEDURE — 97140 MANUAL THERAPY 1/> REGIONS: CPT

## 2018-06-19 PROCEDURE — 97012 MECHANICAL TRACTION THERAPY: CPT

## 2018-06-19 PROCEDURE — 97110 THERAPEUTIC EXERCISES: CPT

## 2018-06-19 NOTE — PROGRESS NOTES
Mary Lopez  : 1965 [address] at [facility]. [facility] [address], [facility], [address]  [phone]   [phone]      Payor: BLUE CROSS [facility] / Plan: [facility] STATE / Product Type: PPO /      OUTPATIENT PHYSICAL [address] Note 2018    ICD-10: Treatment Diagnosis: (M54.2) Cervicalgia; (M50.30) DDD Cervical; (M54.12) Cervical radiculopathy; (M62.81) muscle weakness  Precautions/Allergies:   Celebrex [celecoxib] and Pcn [penicillins]   Fall Risk Score: 1 (? 5 = High Risk)  MD Orders: Evaluate and Treat to include traction MEDICAL/REFERRING DIAGNOSIS:  Other cervical disc degeneration, unspecified cervical region [M50.30]  Radiculopathy, cervical region [M54.12]   DATE OF ONSET: 2018  REFERRING PHYSICIAN: Taylor Fry,*  RETURN PHYSICIAN APPOINTMENT: After Therapy     INITIAL ASSESSMENT:  Mr. Daniel Brunner presents with s/s consistent with the diagnosis of cervical DDD and radiculopathy. Upon evaluation he demonstrated restrictions in cervical spine AROM, as well as, intermittent R side pain and radiculopathy extending to his R AC joint. He reports pain is not constant, but can increase to a 6-7/10 at times. He scored a 12% disability rating on the NDI, however, he does wake at night due to pain. Pt will benefit from skilled PT to address the following deficits. PROBLEM LIST (Impacting functional limitations):  1. Decreased Strength  2. Increased Pain  3. Decreased Flexibility/Joint Mobility  4. Decreased Knowledge of Precautions  5. Decreased [place] with Home Exercise Program INTERVENTIONS PLANNED:  1. Cold  2. Electrical Stimulation  3. Heat  4. Home Exercise Program (HEP)  5. Manual Therapy  6. Neuromuscular Re-education/Strengthening  7. Range of Motion (ROM)  8. Therapeutic Exercise/Strengthening  9. Transcutaneous Electrical Nerve Stimulation (TENS)  10. Ultrasound (US)  11. Mechanical Traction   12.  Kinesiotaping TREATMENT PLAN:  Effective Dates: 6/5/2018 TO 9/3/2018 (90 days). Frequency/Duration: 2 times a week for 90 Days  GOALS: (Goals have been discussed and agreed upon with patient.)  Short-Term Functional Goals: Time Frame: 4-6 weeks) (7-17-18)  1. Pt will be compliant and independent with HEP and demonstrating correct, erect posture without an increase in neck pain. 2. Pt will improve NDI score to 5/50 to increase pt's overall functional mobility. 3. Pt will subjectively report no longer waking during the night due to neck pain. 4. Pt to subjectively report a decrease in pain to 3/10 @ worst to increase pt's ease with driving and reading. 5. Pt will improve Cervical AROM to Cervical Flex: 75% and R Rotation 75% with pain 3/10 @ worst that occurs only on occasion. Discharge Goals: Time Frame: 8-12 weeks (9/3/18)  1. Pt will improve NDI score to < 4/50 to increase pt's overall functional mobility. 2. Pt will subjectively report little to no neck pain when turning his head L or R to allow him greater ease with performing his job in . 3. Pt will improve cervical AROM to > 80% all directions with min to no pain. 6. Pt will deny R superior shoulder pain with OH reaching/lifting. 4. Pt will subjectively report a decrease in pain to 1/10 @ worst to allow pt greater ease with reaching/working overhead. 5. Pt will be discharged from PT to [facility]. Rehabilitation Potential For Stated Goals: Good  Regarding Donna Meigs therapy, I certify that the treatment plan above will be carried out by a therapist or under their direction. Thank you for this referral,  Fadi Jackson                 The information in this section was collected on 6/5/18 (except where otherwise noted). HISTORY:   History of Present Injury/Illness (Reason for Referral):  Pt reports he had no neck or R shoulder/UE pain until he was traveling out of town at with work and slept in a hotel.  He states he woke up with a stiff neck, however, his pain proceeded to worsen later in the day when he got on a flight and lifted his carry on to put it in an overhead bin. He states at that time, his pain was a 7-8/10. He states his pain has improved, and is currently a 3/10. He reports turning his head to R and L increases his pain to a 6/10. He states MD ordered Vimovo (NSAID) that he will  from the pharmacy today. He denies taking any other meds or using headt/ice muscle rubs, massage, etc. He did purchase a new pillow, and feels it has helped some. Past Medical History/Comorbidities:   Mr. Sadaf Rothman  has a past medical history of Hypertension; Obesity (BMI 30.0-34.9) (11/16/2016); Osteoarthritis; and White coat syndrome without diagnosis of hypertension. Mr. Sadaf Rothman  has a past surgical history that includes pr total knee arthroplasty (Left, 01/2016). Social History/Living Environment:     Pt reports he lives in a 1 story home with his wife. He reports independence with ADLs. Prior Level of Function/Work/Activity:  Pt reports he still walks and plays golf for exercise. He denies that his pain has stopped him from doing so, He is in ,and drives ~ [id number], 5 days/week which is sometimes challenging on his painful days. Dominant Side:         RIGHT  Current Medications:       Current Outpatient Prescriptions:     naproxen sodium (ALEVE) 220 mg tablet, Take 440 mg by mouth as needed. Hold for surgery- pt reports last dose 11/16/16 @@ 0600, Disp: , Rfl:     acetaminophen (TYLENOL) 325 mg tablet, Take 650 mg by mouth every six (6) hours as needed for Pain., Disp: , Rfl:     atenolol-chlorthalidone (TENORETIC) 50-25 mg per tablet, Take 1 Tab by mouth every evening. Indications: HYPERTENSION, Disp: , Rfl:    Date Last Reviewed:  6/5/18   EXAMINATION:   Observation/Orthostatic Postural Assessment:          Pt sits/stands with minimally forward head and rounded shoulders.  He's somewhat guarded with cervical rotation AROM.  Palpation:          Pt c/o increased pain with palpation to central and R side C5-C6, indicating the likelihood of a mechanical rotation and/or spurring present. Trigger points: none noted  Flexibility: Pec minor; UT; Levator Scap: min tightness throughout  Posture/Deformity:  Cervical AROM: % of normal motion in standing  Cervical spine Date:  6/5/18 Date:   Date:     Direction  Parameters Parameters Parameters   Flexion  50%; R pain     Extension  75% no pain     Rotation  R: 50% R pain  L: 75% no pain     Side bending  R: 75%  L:75% no pain     Shoulder Flex R:WFL  L:WFL     Shoulder ABD R:WFL  L:WFL     Shoulder ER/IR R:WFL  L:WFL  No shoulder pain with AROM today; in the past week, he's had R sup shoulder pain with OH activities         Strength Date:  6/5/18 Date:   Date:     Cervical/Shoulder  Parameters Parameters   Cervical Grossly 3+/5     Shoulder  Grossly 4/5 B        Myotomes: Normal and equal B throughout  Diaphragm (C4):  Deltoid/Biceps (C5):  Wrist Extensors (C6):  Triceps (C7):  Flexor Profundus (C8):    Sensation:  Normal and equal B throughout  Biceps (C5):   Alvarado Radial (C6-C7):  Alvarado Ulner (C8-T1):    Special Test/Function:  Compression: negative  Distraction:negative  Spurling's maneuver:negative  Accessory mobility:restricted C4-C6      Body Structures Involved:  1. Nerves  2. Bones  3. Joints  4. Muscles  5. Ligaments Body Functions Affected:  1. Sensory/Pain  2. Neuromusculoskeletal  3. Movement Related  Activities and Participation Affected:  1. Mobility  2. Self Care  3. Domestic Life  4. Driving   CLINICAL PRESENTATION:   CLINICAL DECISION MAKING:   Outcome Measure: Tool Used: Neck Disability Index (NDI)  Score:  Initial: 6/50=12% disability  Most Recent: X/50 (Date: -- )   Interpretation of Score: The Neck Disability Index is a revised form of the Oswestry Low Back Pain Index and is designed to measure the activities of daily living in person's with neck pain.   Each section is scored on a 0-5 scale, 5 representing the greatest disability. The scores of each section are added together for a total score of 50. Score 0 1-10 11-20 21-30 31-40 41-49 50   Modifier CH CI CJ CK CL CM CN        Medical Necessity:   · Patient is expected to demonstrate progress in strength, range of motion, balance, coordination and functional technique to improve posture, pain and cervical AROM. Denys Hemphill TREATMENT:   (In addition to Assessment/Re-Assessment sessions the following treatments were rendered)  Pre-treatment Symptoms/Complaints:  Pt reports less neck pain and continued improved mobility this week. Pain: Initial:     2/10 Post Session:  0/10     THERAPEUTIC EXERCISE: (10 minutes):  Exercises per grid below to improve mobility, strength, balance and coordination. Required moderate visual, verbal, manual and tactile cues to promote proper body alignment, promote proper body posture, promote proper body mechanics and promote proper body breathing techniques. Progressed resistance, range, repetitions and complexity of movement as indicated. ULTRASOUND: (10minutes): @ 3.3MHz, 100%, 1.0w/cm2 to B cervical paraspinals, B UT, and B levator scap to aide with decreasing muscle tightness and improving tissue mobility,     MANUAL THERAPY: (15minutes): Consisting of STM/MFR to B cervical paraspinals, B UT, B levator scap and R anterior scalenes, as well as, trigger point release to R UT to aide with improving tissue mobility while decreasing pain. MECHANICAL TRACTION: (15minutes): Static @ 50% speed, 2 steps, (30#/12#). Traction was used due to the patient's cervical radiculopathy in order to relieve pain in or originating from his spine. KINESIOTAPING: (0minutes): to inhibit B cervical paraspinals, (\"Y\" strip 25% pull) and B UT (2 \"I\" strips 50% pull) to aide with decreasing muscle/tissue tightness/pain.       Date:  6/5/18 Date:  6/15/18 Date:  6/19/19   Activity/Exercise Parameters Parameters Parameters   Posterior shoulder rolls 10x 15x    Scapular retraction 10x 15x    Shoulder shrugs 10x 15x    UT stretch 2 x 30sec     Sitting and Supine cervical retraction w/ chin tuck/head lift  10x each 20x supine   Stdg drwy pec stretch  3 x 20sec B    Education  HEP posture while driving and working on computer    Cervical anti-rotation against wall with yellow band behind head and alternating elbow straightening   20x                     Treatment/Session Assessment:  R side cervical paraspinals, R UT, R levator scap remain tight, but decrease in tightness/pain reported following above interventions. Cervical stabilization progressed today with no increase in pain. Pt was also able to tolerate increased poundage pull on mechanical traction without adverse affects. Overall, pt is progressing toward current goals. · Response to Treatment:  Decrease in pain reported and improved mobility throughout noted. · Compliance with Program/Exercises: Pt reports daily compliance with above postural exercises and stretching. · Recommendations/Intent for next treatment session: Continue PT 2x/week to increase cervical AROM while decreasing pain and muscle tightness to improve pt's posture and overall functional mobility. Progress postural strengthening as pt is able.      Future Appointments  Date Time Provider Renita Brush   6/26/2018 3:30 PM Stacey Faustin, PT [facility]   6/29/2018 9:30 AM [facility]in [facility]   7/3/2018 3:30 PM Stacey Faustin PT [facility]   7/6/2018 9:30 AM [facility]   7/10/2018 3:30 PM [facility]   7/13/2018 9:30 AM Stacey Faustin PT [facility]       Total Treatment Duration: 50minutes  PT Patient Time In/Time Out  Time In: 1430  Time Out: [address] [facility]

## 2018-06-21 ENCOUNTER — APPOINTMENT (OUTPATIENT)
Dept: PHYSICAL THERAPY | Age: 53
End: 2018-06-21
Payer: COMMERCIAL

## 2018-06-26 ENCOUNTER — HOSPITAL ENCOUNTER (OUTPATIENT)
Dept: PHYSICAL THERAPY | Age: 53
Discharge: HOME | End: 2018-06-26
Payer: COMMERCIAL

## 2018-06-26 PROCEDURE — 97035 APP MDLTY 1+ULTRASOUND EA 15: CPT | Performed by: PHYSICAL THERAPIST

## 2018-06-26 PROCEDURE — 97012 MECHANICAL TRACTION THERAPY: CPT | Performed by: PHYSICAL THERAPIST

## 2018-06-26 PROCEDURE — 97140 MANUAL THERAPY 1/> REGIONS: CPT | Performed by: PHYSICAL THERAPIST

## 2018-06-26 NOTE — PROGRESS NOTES
Colette Baker  : 1965  Primary: [facility]  Secondary:  [address] at [facility]  [address], [address], [place] [address]  [phone]   [phone]       Payor: Abilio Villeda / Plan: [address] / Product Type: PPO /      OUTPATIENT PHYSICAL [address] Note 2018    ICD-10: Treatment Diagnosis: (M54.2) Cervicalgia; (M50.30) DDD Cervical; (M54.12) Cervical radiculopathy; (M62.81) muscle weakness  Precautions/Allergies:   Celebrex [celecoxib] and Pcn [penicillins]   Fall Risk Score: 1 (? 5 = High Risk)  MD Orders: Evaluate and Treat to include traction MEDICAL/REFERRING DIAGNOSIS:  Radiculopathy, cervical region [M54.12]   DATE OF ONSET: 2018  REFERRING PHYSICIAN: Chantel Hodges,*  RETURN PHYSICIAN APPOINTMENT: After Therapy     INITIAL ASSESSMENT:  Mr. Kayleen Jose presents with s/s consistent with the diagnosis of cervical DDD and radiculopathy. Upon evaluation he demonstrated restrictions in cervical spine AROM, as well as, intermittent R side pain and radiculopathy extending to his R AC joint. He reports pain is not constant, but can increase to a 6-7/10 at times. He scored a 12% disability rating on the NDI, however, he does wake at night due to pain. Pt will benefit from skilled PT to address the following deficits. PROBLEM LIST (Impacting functional limitations):  1. Decreased Strength  2. Increased Pain  3. Decreased Flexibility/Joint Mobility  4. Decreased Knowledge of Precautions  5. Decreased [place] with Home Exercise Program INTERVENTIONS PLANNED:  1. Cold  2. Electrical Stimulation  3. Heat  4. Home Exercise Program (HEP)  5. Manual Therapy  6. Neuromuscular Re-education/Strengthening  7. Range of Motion (ROM)  8. Therapeutic Exercise/Strengthening  9. Transcutaneous Electrical Nerve Stimulation (TENS)  10. Ultrasound (US)  11. Mechanical Traction   12.  Kinesiotaping   TREATMENT PLAN:  Effective Dates: 6/5/2018 TO 9/3/2018 (90 days). Frequency/Duration: 2 times a week for 90 Days  GOALS: (Goals have been discussed and agreed upon with patient.)  Short-Term Functional Goals: Time Frame: 4-6 weeks) (7-17-18)  1. Pt will be compliant and independent with HEP and demonstrating correct, erect posture without an increase in neck pain. 2. Pt will improve NDI score to 5/50 to increase pt's overall functional mobility. 3. Pt will subjectively report no longer waking during the night due to neck pain. 4. Pt to subjectively report a decrease in pain to 3/10 @ worst to increase pt's ease with driving and reading. 5. Pt will improve Cervical AROM to Cervical Flex: 75% and R Rotation 75% with pain 3/10 @ worst that occurs only on occasion. Discharge Goals: Time Frame: 8-12 weeks (9/3/18)  1. Pt will improve NDI score to < 4/50 to increase pt's overall functional mobility. 2. Pt will subjectively report little to no neck pain when turning his head L or R to allow him greater ease with performing his job in . 3. Pt will improve cervical AROM to > 80% all directions with min to no pain. 6. Pt will deny R superior shoulder pain with OH reaching/lifting. 4. Pt will subjectively report a decrease in pain to 1/10 @ worst to allow pt greater ease with reaching/working overhead. 5. Pt will be discharged from PT to [facility]. Rehabilitation Potential For Stated Goals: Good  Regarding Lori Bird therapy, I certify that the treatment plan above will be carried out by a therapist or under their direction. Thank you for this referral,  Kristin Aburto, PT                 The information in this section was collected on 6/5/18 (except where otherwise noted). HISTORY:   History of Present Injury/Illness (Reason for Referral):  Pt reports he had no neck or R shoulder/UE pain until he was traveling out of town at with work and slept in a hotel.  He states he woke up with a stiff neck, however, his pain proceeded to worsen later in the day when he got on a flight and lifted his carry on to put it in an overhead bin. He states at that time, his pain was a 7-8/10. He states his pain has improved, and is currently a 3/10. He reports turning his head to R and L increases his pain to a 6/10. He states MD ordered Vimovo (NSAID) that he will  from the pharmacy today. He denies taking any other meds or using headt/ice muscle rubs, massage, etc. He did purchase a new pillow, and feels it has helped some. Past Medical History/Comorbidities:   Mr. Ina Agrawal  has a past medical history of Hypertension; Obesity (BMI 30.0-34.9) (11/16/2016); Osteoarthritis; and White coat syndrome without diagnosis of hypertension. Mr. Ina Agrawal  has a past surgical history that includes pr total knee arthroplasty (Left, 01/2016). Social History/Living Environment:     Pt reports he lives in a 1 story home with his wife. He reports independence with ADLs. Prior Level of Function/Work/Activity:  Pt reports he still walks and plays golf for exercise. He denies that his pain has stopped him from doing so, He is in ,and drives ~ [id number], 5 days/week which is sometimes challenging on his painful days. Dominant Side:         RIGHT  Current Medications:       Current Outpatient Prescriptions:     naproxen sodium (ALEVE) 220 mg tablet, Take 440 mg by mouth as needed. Hold for surgery- pt reports last dose 11/16/16 @@ 0600, Disp: , Rfl:     acetaminophen (TYLENOL) 325 mg tablet, Take 650 mg by mouth every six (6) hours as needed for Pain., Disp: , Rfl:     atenolol-chlorthalidone (TENORETIC) 50-25 mg per tablet, Take 1 Tab by mouth every evening. Indications: HYPERTENSION, Disp: , Rfl:    Date Last Reviewed:  6/5/18   EXAMINATION:   Observation/Orthostatic Postural Assessment:          Pt sits/stands with minimally forward head and rounded shoulders. He's somewhat guarded with cervical rotation AROM.   Palpation:          Pt c/o increased pain with palpation to central and R side C5-C6, indicating the likelihood of a mechanical rotation and/or spurring present. Trigger points: none noted  Flexibility: Pec minor; UT; Levator Scap: min tightness throughout  Posture/Deformity:  Cervical AROM: % of normal motion in standing  Cervical spine Date:  6/5/18 Date:   Date:     Direction  Parameters Parameters Parameters   Flexion  50%; R pain     Extension  75% no pain     Rotation  R: 50% R pain  L: 75% no pain     Side bending  R: 75%  L:75% no pain     Shoulder Flex R:WFL  L:WFL     Shoulder ABD R:WFL  L:WFL     Shoulder ER/IR R:WFL  L:WFL  No shoulder pain with AROM today; in the past week, he's had R sup shoulder pain with OH activities         Strength Date:  6/5/18 Date:   Date:     Cervical/Shoulder  Parameters Parameters   Cervical Grossly 3+/5     Shoulder  Grossly 4/5 B        Myotomes: Normal and equal B throughout  Diaphragm (C4):  Deltoid/Biceps (C5):  Wrist Extensors (C6):  Triceps (C7):  Flexor Profundus (C8):    Sensation:  Normal and equal B throughout  Biceps (C5):   Alvarado Radial (C6-C7):  Alvarado Ulner (C8-T1):    Special Test/Function:  Compression: negative  Distraction:negative  Spurling's maneuver:negative  Accessory mobility:restricted C4-C6      Body Structures Involved:  1. Nerves  2. Bones  3. Joints  4. Muscles  5. Ligaments Body Functions Affected:  1. Sensory/Pain  2. Neuromusculoskeletal  3. Movement Related  Activities and Participation Affected:  1. Mobility  2. Self Care  3. Domestic Life  4. Driving   CLINICAL PRESENTATION:   CLINICAL DECISION MAKING:   Outcome Measure: Tool Used: Neck Disability Index (NDI)  Score:  Initial: 6/50=12% disability  Most Recent: X/50 (Date: -- )   Interpretation of Score: The Neck Disability Index is a revised form of the Oswestry Low Back Pain Index and is designed to measure the activities of daily living in person's with neck pain.   Each section is scored on a 0-5 scale, 5 representing the greatest disability. The scores of each section are added together for a total score of 50. Score 0 1-10 11-20 21-30 31-40 41-49 50   Modifier CH CI CJ CK CL CM CN        Medical Necessity:   · Patient is expected to demonstrate progress in strength, range of motion, balance, coordination and functional technique to improve posture, pain and cervical AROM. Sebastian Ruby TREATMENT:   (In addition to Assessment/Re-Assessment sessions the following treatments were rendered)  Pre-treatment Symptoms/Complaints:  Pt reports R side neck pain and stiffness continue to improve. He states he's only had ~ 3 instances of sharp pain across his R shoulder over the last week. Pain: Initial:     1-2/10 Post Session:  0/10     THERAPEUTIC EXERCISE: (0 minutes):  Exercises per grid below to improve mobility, strength, balance and coordination. Required moderate visual, verbal, manual and tactile cues to promote proper body alignment, promote proper body posture, promote proper body mechanics and promote proper body breathing techniques. Progressed resistance, range, repetitions and complexity of movement as indicated. ULTRASOUND: (10minutes): @ 3.3MHz, 100%, 1.0w/cm2 to B cervical paraspinals, B UT, and B levator scap to aide with decreasing muscle tightness and improving tissue mobility,     MANUAL THERAPY: (15minutes): Consisting of STM/MFR to B cervical paraspinals, B UT, B levator scap and R anterior scalenes, as well as, trigger point release to R UT to aide with improving tissue mobility while decreasing pain. MECHANICAL TRACTION: (15minutes): Static @ 50% speed, 2 steps, (30#/12#). Traction was used due to the patient's cervical radiculopathy in order to relieve pain in or originating from his spine. KINESIOTAPING: (5minutes): to inhibit B cervical paraspinals, (\"Y\" strip 25% pull) and B UT (2 \"I\" strips 50% pull) to aide with decreasing muscle/tissue tightness/pain.       Date:  6/5/18 Date:  6/15/18 Date:  6/19/19   Activity/Exercise Parameters Parameters Parameters   Posterior shoulder rolls 10x 15x    Scapular retraction 10x 15x    Shoulder shrugs 10x 15x    UT stretch 2 x 30sec     Sitting and Supine cervical retraction w/ chin tuck/head lift  10x each 20x supine   Stdg drwy pec stretch  3 x 20sec B    Education  HEP posture while driving and working on computer    Cervical anti-rotation against wall with yellow band behind head and alternating elbow straightening   20x                     Treatment/Session Assessment: Mild tightness noted in R UT and levator scap on entry with decreased R cervical rotation. He responded well to above treatment without complaints. Kinesiotaping performed today to aide with postural awareness. Pt is progressing toward current goals. · Response to Treatment:  Decreased R side cervical stiffness, as well as, improved R cervical rotation after treatment. · Compliance with Program/Exercises: Pt reports daily compliance with above postural exercises and stretching. · Recommendations/Intent for next treatment session: Continue PT 2x/week to increase cervical AROM while decreasing pain and muscle tightness to improve pt's posture and overall functional mobility. Progress postural strengthening as pt is able.      Future Appointments  Date Time Provider Renita Brush   7/3/2018 3:30 PM Kyle Clayton PT [facility]   7/6/2018 9:30 AM KAR Rubi [facility]   7/10/2018 3:30 PM KAR Rubi [facility]   7/13/2018 9:30 AM KAR Rubi [facility]   7/17/2018 3:30 PM KAR Rubi [facility]       Total Treatment Duration: 45minutes  PT Patient Time In/Time Out  Time In: 1530  Time Out: [place], PT

## 2018-06-29 ENCOUNTER — APPOINTMENT (OUTPATIENT)
Dept: PHYSICAL THERAPY | Age: 53
End: 2018-06-29
Payer: COMMERCIAL

## 2018-07-03 ENCOUNTER — HOSPITAL ENCOUNTER (OUTPATIENT)
Dept: PHYSICAL THERAPY | Age: 53
Discharge: HOME | End: 2018-07-03
Payer: COMMERCIAL

## 2018-07-03 PROCEDURE — 97140 MANUAL THERAPY 1/> REGIONS: CPT | Performed by: PHYSICAL THERAPIST

## 2018-07-03 PROCEDURE — 97012 MECHANICAL TRACTION THERAPY: CPT | Performed by: PHYSICAL THERAPIST

## 2018-07-03 PROCEDURE — 97035 APP MDLTY 1+ULTRASOUND EA 15: CPT | Performed by: PHYSICAL THERAPIST

## 2018-07-03 NOTE — PROGRESS NOTES
Rangel Ribeiro  : 1965  Primary: [facility]  Secondary:  [address] at [facility], [facility]  [address], [address], [place]  [phone]   [phone]       Payor: Abilio Villeda / Plan: [address] / Product Type: PPO /      OUTPATIENT PHYSICAL [address] Note 7/3/2018    ICD-10: Treatment Diagnosis: (M54.2) Cervicalgia; (M50.30) DDD Cervical; (M54.12) Cervical radiculopathy; (M62.81) muscle weakness  Precautions/Allergies:   Celebrex [celecoxib] and Pcn [penicillins]   Fall Risk Score: 1 (? 5 = High Risk)  MD Orders: Evaluate and Treat to include traction MEDICAL/REFERRING DIAGNOSIS:  Radiculopathy, cervical region [M54.12]   DATE OF ONSET: 2018  REFERRING PHYSICIAN: Chadd Spangler,*  RETURN PHYSICIAN APPOINTMENT: After Therapy     INITIAL ASSESSMENT:  Mr. Sienna Henry presents with s/s consistent with the diagnosis of cervical DDD and radiculopathy. Upon evaluation he demonstrated restrictions in cervical spine AROM, as well as, intermittent R side pain and radiculopathy extending to his R AC joint. He reports pain is not constant, but can increase to a 6-7/10 at times. He scored a 12% disability rating on the NDI, however, he does wake at night due to pain. Pt will benefit from skilled PT to address the following deficits. PROBLEM LIST (Impacting functional limitations):  1. Decreased Strength  2. Increased Pain  3. Decreased Flexibility/Joint Mobility  4. Decreased Knowledge of Precautions  5. Decreased [place] with Home Exercise Program INTERVENTIONS PLANNED:  1. Cold  2. Electrical Stimulation  3. Heat  4. Home Exercise Program (HEP)  5. Manual Therapy  6. Neuromuscular Re-education/Strengthening  7. Range of Motion (ROM)  8. Therapeutic Exercise/Strengthening  9. Transcutaneous Electrical Nerve Stimulation (TENS)  10. Ultrasound (US)  11. Mechanical Traction   12.  Kinesiotaping   TREATMENT PLAN:  Effective Dates: 6/5/2018 TO 9/3/2018 (90 days). Frequency/Duration: 2 times a week for 90 Days  GOALS: (Goals have been discussed and agreed upon with patient.)  Short-Term Functional Goals: Time Frame: 4-6 weeks) (7-17-18)  1. Pt will be compliant and independent with HEP and demonstrating correct, erect posture without an increase in neck pain. 2. Pt will improve NDI score to 5/50 to increase pt's overall functional mobility. 3. Pt will subjectively report no longer waking during the night due to neck pain. 4. Pt to subjectively report a decrease in pain to 3/10 @ worst to increase pt's ease with driving and reading. 5. Pt will improve Cervical AROM to Cervical Flex: 75% and R Rotation 75% with pain 3/10 @ worst that occurs only on occasion. Discharge Goals: Time Frame: 8-12 weeks (9/3/18)  1. Pt will improve NDI score to < 4/50 to increase pt's overall functional mobility. 2. Pt will subjectively report little to no neck pain when turning his head L or R to allow him greater ease with performing his job in . 3. Pt will improve cervical AROM to > 80% all directions with min to no pain. 6. Pt will deny R superior shoulder pain with OH reaching/lifting. 4. Pt will subjectively report a decrease in pain to 1/10 @ worst to allow pt greater ease with reaching/working overhead. 5. Pt will be discharged from PT to HEP. Rehabilitation Potential For Stated Goals: Good  Regarding Alyson An therapy, I certify that the treatment plan above will be carried out by a therapist or under their direction. Thank you for this referral,  [address], PT                 The information in this section was collected on 6/5/18 (except where otherwise noted). HISTORY:   History of Present Injury/Illness (Reason for Referral):  Pt reports he had no neck or R shoulder/UE pain until he was traveling out of town at with work and slept in a hotel.  He states he woke up with a stiff neck, however, his pain proceeded to worsen later in the day when he got on a flight and lifted his carry on to put it in an overhead bin. He states at that time, his pain was a 7-8/10. He states his pain has improved, and is currently a 3/10. He reports turning his head to R and L increases his pain to a 6/10. He states MD ordered Vimovo (NSAID) that he will  from the pharmacy today. He denies taking any other meds or using headt/ice muscle rubs, massage, etc. He did purchase a new pillow, and feels it has helped some. Past Medical History/Comorbidities:   Mr. Chely Rangel  has a past medical history of Hypertension; Obesity (BMI 30.0-34.9) (11/16/2016); Osteoarthritis; and White coat syndrome without diagnosis of hypertension. Mr. Chely Rangel  has a past surgical history that includes pr total knee arthroplasty (Left, 01/2016). Social History/Living Environment:     Pt reports he lives in a 1 story home with his wife. He reports independence with ADLs. Prior Level of Function/Work/Activity:  Pt reports he still walks and plays golf for exercise. He denies that his pain has stopped him from doing so, He is in ,and drives ~ [id number], 5 days/week which is sometimes challenging on his painful days. Dominant Side:         RIGHT  Current Medications:       Current Outpatient Prescriptions:     naproxen sodium (ALEVE) 220 mg tablet, Take 440 mg by mouth as needed. Hold for surgery- pt reports last dose 11/16/16 @@ 0600, Disp: , Rfl:     acetaminophen (TYLENOL) 325 mg tablet, Take 650 mg by mouth every six (6) hours as needed for Pain., Disp: , Rfl:     atenolol-chlorthalidone (TENORETIC) 50-25 mg per tablet, Take 1 Tab by mouth every evening. Indications: HYPERTENSION, Disp: , Rfl:    Date Last Reviewed:  6/5/18   EXAMINATION:   Observation/Orthostatic Postural Assessment:          Pt sits/stands with minimally forward head and rounded shoulders. He's somewhat guarded with cervical rotation AROM.   Palpation:          Pt c/o increased pain with palpation to central and R side C5-C6, indicating the likelihood of a mechanical rotation and/or spurring present. Trigger points: none noted  Flexibility: Pec minor; UT; Levator Scap: min tightness throughout  Posture/Deformity:  Cervical AROM: % of normal motion in standing  Cervical spine Date:  6/5/18 Date:   Date:     Direction  Parameters Parameters Parameters   Flexion  50%; R pain     Extension  75% no pain     Rotation  R: 50% R pain  L: 75% no pain     Side bending  R: 75%  L:75% no pain     Shoulder Flex R:WFL  L:WFL     Shoulder ABD R:WFL  L:WFL     Shoulder ER/IR R:WFL  L:WFL  No shoulder pain with AROM today; in the past week, he's had R sup shoulder pain with OH activities         Strength Date:  6/5/18 Date:   Date:     Cervical/Shoulder  Parameters Parameters   Cervical Grossly 3+/5     Shoulder  Grossly 4/5 B        Myotomes: Normal and equal B throughout  Diaphragm (C4):  Deltoid/Biceps (C5):  Wrist Extensors (C6):  Triceps (C7):  Flexor Profundus (C8):    Sensation:  Normal and equal B throughout  Biceps (C5):   Alvarado Radial (C6-C7):  Alvarado Ulner (C8-T1):    Special Test/Function:  Compression: negative  Distraction:negative  Spurling's maneuver:negative  Accessory mobility:restricted C4-C6      Body Structures Involved:  1. Nerves  2. Bones  3. Joints  4. Muscles  5. Ligaments Body Functions Affected:  1. Sensory/Pain  2. Neuromusculoskeletal  3. Movement Related  Activities and Participation Affected:  1. Mobility  2. Self Care  3. Domestic Life  4. Driving   CLINICAL PRESENTATION:   CLINICAL DECISION MAKING:   Outcome Measure: Tool Used: Neck Disability Index (NDI)  Score:  Initial: 6/50=12% disability  Most Recent: X/50 (Date: -- )   Interpretation of Score: The Neck Disability Index is a revised form of the Oswestry Low Back Pain Index and is designed to measure the activities of daily living in person's with neck pain.   Each section is scored on a 0-5 scale, 5 representing the greatest disability. The scores of each section are added together for a total score of 50. Score 0 1-10 11-20 21-30 31-40 41-49 50   Modifier CH CI CJ CK CL CM CN        Medical Necessity:   · Patient is expected to demonstrate progress in strength, range of motion, balance, coordination and functional technique to improve posture, pain and cervical AROM. [facility] TREATMENT:   (In addition to Assessment/Re-Assessment sessions the following treatments were rendered)  Pre-treatment Symptoms/Complaints:  Pt reports his neck pain and stiffness is still present, but continues to improve. He states he was getting more relief when he was able to come 2x/week. He hopes to return to that after the [address]. Pain: Initial:     2/10 Post Session:  0-1/10     THERAPEUTIC EXERCISE: (0 minutes):  Exercises per grid below to improve mobility, strength, balance and coordination. Required moderate visual, verbal, manual and tactile cues to promote proper body alignment, promote proper body posture, promote proper body mechanics and promote proper body breathing techniques. Progressed resistance, range, repetitions and complexity of movement as indicated. ULTRASOUND: (10minutes): @ 3.3MHz, 100%, 1.0w/cm2 to B cervical paraspinals, B UT, and B levator scap to aide with decreasing muscle tightness and improving tissue mobility,     MANUAL THERAPY: (20minutes): Consisting of STM/MFR to B cervical paraspinals, B UT, B levator scap and R anterior scalenes, as well as, trigger point release to R UT to aide with improving tissue mobility while decreasing pain. MECHANICAL TRACTION: (15minutes): Static @ 50% speed, 2 steps, (30#/12#). Traction was used due to the patient's cervical radiculopathy in order to relieve pain in or originating from his spine.     KINESIOTAPING: (0minutes): to inhibit B cervical paraspinals, (\"Y\" strip 25% pull) and B UT (2 \"I\" strips 50% pull) to aide with decreasing muscle/tissue tightness/pain. Date:  6/5/18 Date:  6/15/18 Date:  6/19/19   Activity/Exercise Parameters Parameters Parameters   Posterior shoulder rolls 10x 15x    Scapular retraction 10x 15x    Shoulder shrugs 10x 15x    UT stretch 2 x 30sec     Sitting and Supine cervical retraction w/ chin tuck/head lift  10x each 20x supine   Stdg drwy pec stretch  3 x 20sec B    Education  HEP posture while driving and working on computer    Cervical anti-rotation against wall with yellow band behind head and alternating elbow straightening   20x                     Treatment/Session Assessment: More tightness and tissue density noted in R cervical paraspinals and anterior scalenes today. R UT too remains tight, but not as tight as last week. He continues to respond favorably to modalities and manual therapy. Pt continues to progress toward current goals. · Response to Treatment:  Decreased R side cervical stiffness, as well as, improved R cervical rotation after treatment. · Compliance with Program/Exercises: Pt reports daily compliance with above postural exercises, cervical AROM and stretching. · Recommendations/Intent for next treatment session: Continue PT 2x/week to increase cervical AROM while decreasing pain and muscle tightness to improve pt's posture and overall functional mobility. Progress postural strengthening as pt is able.      Future Appointments  Date Time Provider Renita Brush   7/6/2018 9:30 AM [place] PT LUCY AGRCES   7/10/2018 3:30 PM [place], PT LUCY GARCES   7/13/2018 9:30 AM Hoover [place] PT LUCY GARCES   7/17/2018 3:30 PM [place] PT LUCY WATSONIUM       Total Treatment Duration: 45minutes  PT Patient Time In/Time Out  Time In: 1535  Time Out: [address], PT

## 2018-07-06 ENCOUNTER — HOSPITAL ENCOUNTER (OUTPATIENT)
Dept: PHYSICAL THERAPY | Age: 53
Discharge: HOME | End: 2018-07-06
Payer: COMMERCIAL

## 2018-07-06 PROCEDURE — 97140 MANUAL THERAPY 1/> REGIONS: CPT | Performed by: PHYSICAL THERAPIST

## 2018-07-06 PROCEDURE — 97035 APP MDLTY 1+ULTRASOUND EA 15: CPT | Performed by: PHYSICAL THERAPIST

## 2018-07-06 PROCEDURE — 97012 MECHANICAL TRACTION THERAPY: CPT | Performed by: PHYSICAL THERAPIST

## 2018-07-06 NOTE — PROGRESS NOTES
Douglas Bills  : 1965  Primary: [facility]  Secondary:  [address]  at [facility]  [address], [address], [place] [address]  [phone]   [phone]       Payor: Abilio Villeda / Plan: [address] / Product Type: PPO /      OUTPATIENT PHYSICAL [address] Note 2018    ICD-10: Treatment Diagnosis: (M54.2) Cervicalgia; (M50.30) DDD Cervical; (M54.12) Cervical radiculopathy; (M62.81) muscle weakness  Precautions/Allergies:   Celebrex [celecoxib] and Pcn [penicillins]   Fall Risk Score: 1 (? 5 = High Risk)  MD Orders: Evaluate and Treat to include traction MEDICAL/REFERRING DIAGNOSIS:  Radiculopathy, cervical region [M54.12]   DATE OF ONSET: 2018  REFERRING PHYSICIAN: Sherren Kass,*  RETURN PHYSICIAN APPOINTMENT: After Therapy     INITIAL ASSESSMENT:  Mr. Salvador Centeno presents with s/s consistent with the diagnosis of cervical DDD and radiculopathy. Upon evaluation he demonstrated restrictions in cervical spine AROM, as well as, intermittent R side pain and radiculopathy extending to his R AC joint. He reports pain is not constant, but can increase to a 6-7/10 at times. He scored a 12% disability rating on the NDI, however, he does wake at night due to pain. Pt will benefit from skilled PT to address the following deficits. PROBLEM LIST (Impacting functional limitations):  1. Decreased Strength  2. Increased Pain  3. Decreased Flexibility/Joint Mobility  4. Decreased Knowledge of Precautions  5. Decreased [place] with Home Exercise Program INTERVENTIONS PLANNED:  1. Cold  2. Electrical Stimulation  3. Heat  4. Home Exercise Program (HEP)  5. Manual Therapy  6. Neuromuscular Re-education/Strengthening  7. Range of Motion (ROM)  8. Therapeutic Exercise/Strengthening  9. Transcutaneous Electrical Nerve Stimulation (TENS)  10. Ultrasound (US)  11. Mechanical Traction   12.  Kinesiotaping   TREATMENT PLAN:  Effective Dates: 6/5/2018 TO 9/3/2018 (90 days). Frequency/Duration: 2 times a week for 90 Days  GOALS: (Goals have been discussed and agreed upon with patient.)  Short-Term Functional Goals: Time Frame: 4-6 weeks) (7-17-18)  1. Pt will be compliant and independent with HEP and demonstrating correct, erect posture without an increase in neck pain. 2. Pt will improve NDI score to 5/50 to increase pt's overall functional mobility. 3. Pt will subjectively report no longer waking during the night due to neck pain. 4. Pt to subjectively report a decrease in pain to 3/10 @ worst to increase pt's ease with driving and reading. 5. Pt will improve Cervical AROM to Cervical Flex: 75% and R Rotation 75% with pain 3/10 @ worst that occurs only on occasion. Discharge Goals: Time Frame: 8-12 weeks (9/3/18)  1. Pt will improve NDI score to < 4/50 to increase pt's overall functional mobility. 2. Pt will subjectively report little to no neck pain when turning his head L or R to allow him greater ease with performing his job in . 3. Pt will improve cervical AROM to > 80% all directions with min to no pain. 6. Pt will deny R superior shoulder pain with OH reaching/lifting. 4. Pt will subjectively report a decrease in pain to 1/10 @ worst to allow pt greater ease with reaching/working overhead. 5. Pt will be discharged from PT to [facility]. Rehabilitation Potential For Stated Goals: Good  Regarding [facility] therapy, I certify that the treatment plan above will be carried out by a therapist or under their direction. Thank you for this referral,  Yaquelin Dorado, PT                 The information in this section was collected on 6/5/18 (except where otherwise noted). HISTORY:   History of Present Injury/Illness (Reason for Referral):  Pt reports he had no neck or R shoulder/UE pain until he was traveling out of town at with work and slept in a hotel.  He states he woke up with a stiff neck, however, his pain proceeded to worsen later in the day when he got on a flight and lifted his carry on to put it in an overhead bin. He states at that time, his pain was a 7-8/10. He states his pain has improved, and is currently a 3/10. He reports turning his head to R and L increases his pain to a 6/10. He states MD ordered Vimovo (NSAID) that he will  from the pharmacy today. He denies taking any other meds or using headt/ice muscle rubs, massage, etc. He did purchase a new pillow, and feels it has helped some. Past Medical History/Comorbidities:   Mr. Dorothy Anglin  has a past medical history of Hypertension; Obesity (BMI 30.0-34.9) (11/16/2016); Osteoarthritis; and White coat syndrome without diagnosis of hypertension. Mr. Dorothy Anglin  has a past surgical history that includes pr total knee arthroplasty (Left, 01/2016). Social History/Living Environment:     Pt reports he lives in a 1 story home with his wife. He reports independence with ADLs. Prior Level of Function/Work/Activity:  Pt reports he still walks and plays golf for exercise. He denies that his pain has stopped him from doing so, He is in ,and drives ~ [id number], 5 days/week which is sometimes challenging on his painful days. Dominant Side:         RIGHT  Current Medications:       Current Outpatient Prescriptions:     naproxen sodium (ALEVE) 220 mg tablet, Take 440 mg by mouth as needed. Hold for surgery- pt reports last dose 11/16/16 @@ 0600, Disp: , Rfl:     acetaminophen (TYLENOL) 325 mg tablet, Take 650 mg by mouth every six (6) hours as needed for Pain., Disp: , Rfl:     atenolol-chlorthalidone (TENORETIC) 50-25 mg per tablet, Take 1 Tab by mouth every evening. Indications: HYPERTENSION, Disp: , Rfl:    Date Last Reviewed:  6/5/18   EXAMINATION:   Observation/Orthostatic Postural Assessment:          Pt sits/stands with minimally forward head and rounded shoulders. He's somewhat guarded with cervical rotation AROM.   Palpation:          Pt c/o increased pain with palpation to central and R side C5-C6, indicating the likelihood of a mechanical rotation and/or spurring present. Trigger points: none noted  Flexibility: Pec minor; UT; Levator Scap: min tightness throughout  Posture/Deformity:  Cervical AROM: % of normal motion in standing  Cervical spine Date:  6/5/18 Date:   Date:     Direction  Parameters Parameters Parameters   Flexion  50%; R pain     Extension  75% no pain     Rotation  R: 50% R pain  L: 75% no pain     Side bending  R: 75%  L:75% no pain     Shoulder Flex R:WFL  L:WFL     Shoulder ABD R:WFL  L:WFL     Shoulder ER/IR R:WFL  L:WFL  No shoulder pain with AROM today; in the past week, he's had R sup shoulder pain with OH activities         Strength Date:  6/5/18 Date:   Date:     Cervical/Shoulder  Parameters Parameters   Cervical Grossly 3+/5     Shoulder  Grossly 4/5 B        Myotomes: Normal and equal B throughout  Diaphragm (C4):  Deltoid/Biceps (C5):  Wrist Extensors (C6):  Triceps (C7):  Flexor Profundus (C8):    Sensation:  Normal and equal B throughout  Biceps (C5):   Alvarado Radial (C6-C7):  Alvarado Ulner (C8-T1):    Special Test/Function:  Compression: negative  Distraction:negative  Spurling's maneuver:negative  Accessory mobility:restricted C4-C6      Body Structures Involved:  1. Nerves  2. Bones  3. Joints  4. Muscles  5. Ligaments Body Functions Affected:  1. Sensory/Pain  2. Neuromusculoskeletal  3. Movement Related  Activities and Participation Affected:  1. Mobility  2. Self Care  3. Domestic Life  4. Driving   CLINICAL PRESENTATION:   CLINICAL DECISION MAKING:   Outcome Measure: Tool Used: Neck Disability Index (NDI)  Score:  Initial: 6/50=12% disability  Most Recent: X/50 (Date: -- )   Interpretation of Score: The Neck Disability Index is a revised form of the Oswestry Low Back Pain Index and is designed to measure the activities of daily living in person's with neck pain.   Each section is scored on a 0-5 scale, 5 representing the greatest disability. The scores of each section are added together for a total score of 50. Score 0 1-10 11-20 21-30 31-40 41-49 50   Modifier CH CI CJ CK CL CM CN        Medical Necessity:   · Patient is expected to demonstrate progress in strength, range of motion, balance, coordination and functional technique to improve posture, pain and cervical AROM. [facility] TREATMENT:   (In addition to Assessment/Re-Assessment sessions the following treatments were rendered)  Pre-treatment Symptoms/Complaints:  Pt reports significantly less R side neck pain and improved mobility since his PT appointment earlier this week. Pain: Initial:     0/10 Post Session:  0-1/10     THERAPEUTIC EXERCISE: (0 minutes):  Exercises per grid below to improve mobility, strength, balance and coordination. Required moderate visual, verbal, manual and tactile cues to promote proper body alignment, promote proper body posture, promote proper body mechanics and promote proper body breathing techniques. Progressed resistance, range, repetitions and complexity of movement as indicated. ULTRASOUND: (10minutes): @ 3.3MHz, 100%, 1.0w/cm2 to B cervical paraspinals, B UT, and B levator scap to aide with decreasing muscle tightness and improving tissue mobility,     MANUAL THERAPY: (30minutes): Consisting of STM/MFR to B cervical paraspinals, B UT, B levator scap and R anterior scalenes, as well as, trigger point release to R UT to aide with improving tissue mobility while decreasing pain. MECHANICAL TRACTION: (15minutes): Static @ 50% speed, 2 steps, (31#/12#). Traction was used due to the patient's cervical radiculopathy in order to relieve pain in or originating from his spine. KINESIOTAPING: (0minutes): to inhibit B cervical paraspinals, (\"Y\" strip 25% pull) and B UT (2 \"I\" strips 50% pull) to aide with decreasing muscle/tissue tightness/pain.       Date:  6/5/18 Date:  6/15/18 Date:  6/19/19   Activity/Exercise Parameters Parameters Parameters   Posterior shoulder rolls 10x 15x    Scapular retraction 10x 15x    Shoulder shrugs 10x 15x    UT stretch 2 x 30sec     Sitting and Supine cervical retraction w/ chin tuck/head lift  10x each 20x supine   Stdg drwy pec stretch  3 x 20sec B    Education  HEP posture while driving and working on computer    Cervical anti-rotation against wall with yellow band behind head and alternating elbow straightening   20x                     Treatment/Session Assessment: Tightness and tissue density in cervical paraspinals is improving. Minimal tightness in R anterior scalenes today. DIfficult for pt to relax for manual cervical traction, therefore returned to mechanical cervical traction today. Pt is progressing toward current goals. · Response to Treatment:  Decreased R side cervical stiffness, as well as, improved R cervical rotation after treatment. · Compliance with Program/Exercises: Pt reports daily compliance with above postural exercises, cervical AROM and stretching. · Recommendations/Intent for next treatment session: Continue PT 2x/week to increase cervical AROM while decreasing pain and muscle tightness to improve pt's posture and overall functional mobility. Progress postural strengthening as pt is able.      Future Appointments  Date Time Provider Renita Brush   7/10/2018 3:30 PM [address] PT [facility]   7/13/2018 9:30 AM [address] PT [facility]   7/17/2018 3:30 PM [address] PT [facility]       Total Treatment Duration: 55minutes  PT Patient Time In/Time Out  Time In: 0920  Time Out: [address]igsby, PT

## 2018-07-10 ENCOUNTER — HOSPITAL ENCOUNTER (OUTPATIENT)
Dept: PHYSICAL THERAPY | Age: 53
Discharge: HOME | End: 2018-07-10
Payer: COMMERCIAL

## 2018-07-10 PROCEDURE — 97140 MANUAL THERAPY 1/> REGIONS: CPT | Performed by: PHYSICAL THERAPIST

## 2018-07-10 PROCEDURE — 97035 APP MDLTY 1+ULTRASOUND EA 15: CPT | Performed by: PHYSICAL THERAPIST

## 2018-07-10 PROCEDURE — 97012 MECHANICAL TRACTION THERAPY: CPT | Performed by: PHYSICAL THERAPIST

## 2018-07-10 NOTE — PROGRESS NOTES
Adarsh Morteza  : 1965  Primary: Cesia Martinez  Secondary:  [address]  at [facility]  [address], [address], [place]  [phone]   [phone]       Payor: Abilio Villeda / Plan: [address] / Product Type: PPO /      OUTPATIENT PHYSICAL [address] Note 7/10/2018    ICD-10: Treatment Diagnosis: (M54.2) Cervicalgia; (M50.30) DDD Cervical; (M54.12) Cervical radiculopathy; (M62.81) muscle weakness  Precautions/Allergies:   Celebrex [celecoxib] and Pcn [penicillins]   Fall Risk Score: 1 (? 5 = High Risk)  MD Orders: Evaluate and Treat to include traction MEDICAL/REFERRING DIAGNOSIS:  Radiculopathy, cervical region [M54.12]   DATE OF ONSET: 2018  REFERRING PHYSICIAN: Gianfranco Antoine,*  RETURN PHYSICIAN APPOINTMENT: After Therapy     INITIAL ASSESSMENT:  Mr. Graciela Macdonald presents with s/s consistent with the diagnosis of cervical DDD and radiculopathy. Upon evaluation he demonstrated restrictions in cervical spine AROM, as well as, intermittent R side pain and radiculopathy extending to his R AC joint. He reports pain is not constant, but can increase to a 6-7/10 at times. He scored a 12% disability rating on the NDI, however, he does wake at night due to pain. Pt will benefit from skilled PT to address the following deficits. PROBLEM LIST (Impacting functional limitations):  1. Decreased Strength  2. Increased Pain  3. Decreased Flexibility/Joint Mobility  4. Decreased Knowledge of Precautions  5. Decreased [place] with Home Exercise Program INTERVENTIONS PLANNED:  1. Cold  2. Electrical Stimulation  3. Heat  4. Home Exercise Program (HEP)  5. Manual Therapy  6. Neuromuscular Re-education/Strengthening  7. Range of Motion (ROM)  8. Therapeutic Exercise/Strengthening  9. Transcutaneous Electrical Nerve Stimulation (TENS)  10. Ultrasound (US)  11. Mechanical Traction   12.  Kinesiotaping   TREATMENT PLAN:  Effective Dates: 6/5/2018 TO 9/3/2018 (90 days). Frequency/Duration: 2 times a week for 90 Days  GOALS: (Goals have been discussed and agreed upon with patient.)  Short-Term Functional Goals: Time Frame: 4-6 weeks) (7-17-18)  1. Pt will be compliant and independent with HEP and demonstrating correct, erect posture without an increase in neck pain. 2. Pt will improve NDI score to 5/50 to increase pt's overall functional mobility. 3. Pt will subjectively report no longer waking during the night due to neck pain. 4. Pt to subjectively report a decrease in pain to 3/10 @ worst to increase pt's ease with driving and reading. 5. Pt will improve Cervical AROM to Cervical Flex: 75% and R Rotation 75% with pain 3/10 @ worst that occurs only on occasion. Discharge Goals: Time Frame: 8-12 weeks (9/3/18)  1. Pt will improve NDI score to < 4/50 to increase pt's overall functional mobility. 2. Pt will subjectively report little to no neck pain when turning his head L or R to allow him greater ease with performing his job in . 3. Pt will improve cervical AROM to > 80% all directions with min to no pain. 6. Pt will deny R superior shoulder pain with OH reaching/lifting. 4. Pt will subjectively report a decrease in pain to 1/10 @ worst to allow pt greater ease with reaching/working overhead. 5. Pt will be discharged from PT to HEP. Rehabilitation Potential For Stated Goals: Good  Regarding Tish Rivera therapy, I certify that the treatment plan above will be carried out by a therapist or under their direction. Thank you for this referral,  Petr Park, PT                 The information in this section was collected on 6/5/18 (except where otherwise noted). HISTORY:   History of Present Injury/Illness (Reason for Referral):  Pt reports he had no neck or R shoulder/UE pain until he was traveling out of town at with work and slept in a hotel.  He states he woke up with a stiff neck, however, his pain proceeded to worsen later in the day when he got on a flight and lifted his carry on to put it in an overhead bin. He states at that time, his pain was a 7-8/10. He states his pain has improved, and is currently a 3/10. He reports turning his head to R and L increases his pain to a 6/10. He states MD ordered Vimovo (NSAID) that he will  from the pharmacy today. He denies taking any other meds or using headt/ice muscle rubs, massage, etc. He did purchase a new pillow, and feels it has helped some. Past Medical History/Comorbidities:   Mr. Lili Babinski  has a past medical history of Hypertension; Obesity (BMI 30.0-34.9) (11/16/2016); Osteoarthritis; and White coat syndrome without diagnosis of hypertension. Mr. Lili Babinski  has a past surgical history that includes pr total knee arthroplasty (Left, 01/2016). Social History/Living Environment:     Pt reports he lives in a 1 story home with his wife. He reports independence with ADLs. Prior Level of Function/Work/Activity:  Pt reports he still walks and plays golf for exercise. He denies that his pain has stopped him from doing so, He is in ,and drives ~ [id number], 5 days/week which is sometimes challenging on his painful days. Dominant Side:         RIGHT  Current Medications:       Current Outpatient Prescriptions:     naproxen sodium (ALEVE) 220 mg tablet, Take 440 mg by mouth as needed. Hold for surgery- pt reports last dose 11/16/16 @@ 0600, Disp: , Rfl:     acetaminophen (TYLENOL) 325 mg tablet, Take 650 mg by mouth every six (6) hours as needed for Pain., Disp: , Rfl:     atenolol-chlorthalidone (TENORETIC) 50-25 mg per tablet, Take 1 Tab by mouth every evening. Indications: HYPERTENSION, Disp: , Rfl:    Date Last Reviewed:  6/5/18   EXAMINATION:   Observation/Orthostatic Postural Assessment:          Pt sits/stands with minimally forward head and rounded shoulders. He's somewhat guarded with cervical rotation AROM.   Palpation:          Pt c/o increased pain with palpation to central and R side C5-C6, indicating the likelihood of a mechanical rotation and/or spurring present. Trigger points: none noted  Flexibility: Pec minor; UT; Levator Scap: min tightness throughout  Posture/Deformity:  Cervical AROM: % of normal motion in standing  Cervical spine Date:  6/5/18 Date:   Date:     Direction  Parameters Parameters Parameters   Flexion  50%; R pain     Extension  75% no pain     Rotation  R: 50% R pain  L: 75% no pain     Side bending  R: 75%  L:75% no pain     Shoulder Flex R:WFL  L:WFL     Shoulder ABD R:WFL  L:WFL     Shoulder ER/IR R:WFL  L:WFL  No shoulder pain with AROM today; in the past week, he's had R sup shoulder pain with OH activities         Strength Date:  6/5/18 Date:   Date:     Cervical/Shoulder  Parameters Parameters   Cervical Grossly 3+/5     Shoulder  Grossly 4/5 B        Myotomes: Normal and equal B throughout  Diaphragm (C4):  Deltoid/Biceps (C5):  Wrist Extensors (C6):  Triceps (C7):  Flexor Profundus (C8):    Sensation:  Normal and equal B throughout  Biceps (C5):   Alvarado Radial (C6-C7):  Alvarado Ulner (C8-T1):    Special Test/Function:  Compression: negative  Distraction:negative  Spurling's maneuver:negative  Accessory mobility:restricted C4-C6      Body Structures Involved:  1. Nerves  2. Bones  3. Joints  4. Muscles  5. Ligaments Body Functions Affected:  1. Sensory/Pain  2. Neuromusculoskeletal  3. Movement Related  Activities and Participation Affected:  1. Mobility  2. Self Care  3. Domestic Life  4. Driving   CLINICAL PRESENTATION:   CLINICAL DECISION MAKING:   Outcome Measure: Tool Used: Neck Disability Index (NDI)  Score:  Initial: 6/50=12% disability  Most Recent: X/50 (Date: -- )   Interpretation of Score: The Neck Disability Index is a revised form of the Oswestry Low Back Pain Index and is designed to measure the activities of daily living in person's with neck pain.   Each section is scored on a 0-5 scale, 5 representing the greatest disability. The scores of each section are added together for a total score of 50. Score 0 1-10 11-20 21-30 31-40 41-49 50   Modifier CH CI CJ CK CL CM CN        Medical Necessity:   · Patient is expected to demonstrate progress in strength, range of motion, balance, coordination and functional technique to improve posture, pain and cervical AROM. Sharla Rios TREATMENT:   (In addition to Assessment/Re-Assessment sessions the following treatments were rendered)  Pre-treatment Symptoms/Complaints:  Pt c/o R side neck tightness and min pain today due to playing 18 holes of golf last pm. He states prior to that, he had no pain over the last week. Pain: Initial:     2/10 Post Session:  0-1/10     THERAPEUTIC EXERCISE: (0 minutes):  Exercises per grid below to improve mobility, strength, balance and coordination. Required moderate visual, verbal, manual and tactile cues to promote proper body alignment, promote proper body posture, promote proper body mechanics and promote proper body breathing techniques. Progressed resistance, range, repetitions and complexity of movement as indicated. ULTRASOUND: (10minutes): @ 3.3MHz, 100%, 1.0w/cm2 to B cervical paraspinals, B UT, and B levator scap to aide with decreasing muscle tightness and improving tissue mobility,     MANUAL THERAPY: (20minutes): Consisting of STM/MFR to B cervical paraspinals, B UT, B levator scap and R anterior scalenes, as well as, trigger point release to R UT to aide with improving tissue mobility while decreasing pain. MECHANICAL TRACTION: (15minutes): Static @ 50% speed, 2 steps, (31#/12#). Traction was used due to the patient's cervical radiculopathy in order to relieve pain in or originating from his spine. KINESIOTAPING: (0minutes): to inhibit B cervical paraspinals, (\"Y\" strip 25% pull) and B UT (2 \"I\" strips 50% pull) to aide with decreasing muscle/tissue tightness/pain.       Date:  6/5/18 Date:  6/15/18 Date:  6/19/19 Activity/Exercise Parameters Parameters Parameters   Posterior shoulder rolls 10x 15x    Scapular retraction 10x 15x    Shoulder shrugs 10x 15x    UT stretch 2 x 30sec     Sitting and Supine cervical retraction w/ chin tuck/head lift  10x each 20x supine   Stdg drwy pec stretch  3 x 20sec B    Education  HEP posture while driving and working on computer    Cervical anti-rotation against wall with yellow band behind head and alternating elbow straightening   20x                     Treatment/Session Assessment: R side anterior scalene tightness continues, requiring aggressive manual therapy with trigger point release today. Pt continues to respond well to US, manual therapy and mechanical traction. · Response to Treatment:  Decreased R side cervical tightness/pain, as well as, improved R cervical rotation after treatment. · Compliance with Program/Exercises: Pt reports daily compliance with above postural exercises, cervical AROM and stretching. · Recommendations/Intent for next treatment session: Continue PT 2x/week to increase cervical AROM while decreasing pain and muscle tightness to improve pt's posture and overall functional mobility. Progress postural strengthening as pt is able.      Future Appointments  Date Time Provider Renita Brush   7/13/2018 9:30 AM KAR Rodriguez   7/17/2018 3:30 PM KAR Rodriguez       Total Treatment Duration: 45minutes  PT Patient Time In/Time Out  Time In: 1530  Time Out: [id number]iqu[address] PT

## 2018-07-13 ENCOUNTER — HOSPITAL ENCOUNTER (OUTPATIENT)
Dept: PHYSICAL THERAPY | Age: 53
Discharge: HOME | End: 2018-07-13
Payer: COMMERCIAL

## 2018-07-13 PROCEDURE — 97035 APP MDLTY 1+ULTRASOUND EA 15: CPT | Performed by: PHYSICAL THERAPIST

## 2018-07-13 PROCEDURE — 97012 MECHANICAL TRACTION THERAPY: CPT | Performed by: PHYSICAL THERAPIST

## 2018-07-13 PROCEDURE — 97140 MANUAL THERAPY 1/> REGIONS: CPT | Performed by: PHYSICAL THERAPIST

## 2018-07-13 NOTE — PROGRESS NOTES
Hero Luther  : 1965  Primary: Panchito Chadwick [facility]  Secondary:  [address] at [facility]  [address], [address], [place] [address]  [phone]   [phone]       Payor: [facility] / Plan: [address] / Product Type: PPO /      OUTPATIENT PHYSICAL THERAPY:Daily Note and Progress Report 2018    ICD-10: Treatment Diagnosis: (M54.2) Cervicalgia; (M50.30) DDD Cervical; (M54.12) Cervical radiculopathy; (M62.81) muscle weakness  Precautions/Allergies:   Celebrex [celecoxib] and Pcn [penicillins]   Fall Risk Score: 1 (? 5 = High Risk)  MD Orders: Evaluate and Treat to include traction MEDICAL/REFERRING DIAGNOSIS:  Radiculopathy, cervical region [M54.12]   DATE OF ONSET: 2018  REFERRING PHYSICIAN: Keon Acosta,*  RETURN PHYSICIAN APPOINTMENT: After Therapy     ASSESSMENT:  Mr. Job Patterson has been seen for PT x 10 visits. He is doing well with minimal c/o pain and improved cervical AROM throughout. He is to have an official re-assessment next week to determine if further treatment will be required. PROBLEM LIST (Impacting functional limitations):  1. Decreased Strength  2. Increased Pain  3. Decreased Flexibility/Joint Mobility  4. Decreased Knowledge of Precautions  5. Decreased [place] with Home Exercise Program INTERVENTIONS PLANNED:  1. Cold  2. Electrical Stimulation  3. Heat  4. Home Exercise Program (HEP)  5. Manual Therapy  6. Neuromuscular Re-education/Strengthening  7. Range of Motion (ROM)  8. Therapeutic Exercise/Strengthening  9. Transcutaneous Electrical Nerve Stimulation (TENS)  10. Ultrasound (US)  11. Mechanical Traction   12. Kinesiotaping   TREATMENT PLAN:  Effective Dates: 2018 TO 9/3/2018 (90 days). Frequency/Duration: 2 times a week for 90 Days  GOALS: (Goals have been discussed and agreed upon with patient.)  Short-Term Functional Goals: Time Frame: 4-6 weeks) (717-18)  1.  Pt will be compliant and independent with HEP and demonstrating correct, erect posture without an increase in neck pain. 2. Pt will improve NDI score to 5/50 to increase pt's overall functional mobility. 3. Pt will subjectively report no longer waking during the night due to neck pain. 4. Pt to subjectively report a decrease in pain to 3/10 @ worst to increase pt's ease with driving and reading. 5. Pt will improve Cervical AROM to Cervical Flex: 75% and R Rotation 75% with pain 3/10 @ worst that occurs only on occasion. Discharge Goals: Time Frame: 8-12 weeks (9/3/18)  1. Pt will improve NDI score to < 4/50 to increase pt's overall functional mobility. 2. Pt will subjectively report little to no neck pain when turning his head L or R to allow him greater ease with performing his job in . 3. Pt will improve cervical AROM to > 80% all directions with min to no pain. 6. Pt will deny R superior shoulder pain with OH reaching/lifting. 4. Pt will subjectively report a decrease in pain to 1/10 @ worst to allow pt greater ease with reaching/working overhead. 5. Pt will be discharged from PT to [facility]. Rehabilitation Potential For Stated Goals: Good  Regarding Tish Rivera therapy, I certify that the treatment plan above will be carried out by a therapist or under their direction. Thank you for this referral,  Petr Park, PT                 The information in this section was collected on 6/5/18 (except where otherwise noted). HISTORY:   History of Present Injury/Illness (Reason for Referral):  Pt reports he had no neck or R shoulder/UE pain until he was traveling out of town at with work and slept in a hotel. He states he woke up with a stiff neck, however, his pain proceeded to worsen later in the day when he got on a flight and lifted his carry on to put it in an overhead bin. He states at that time, his pain was a 7-8/10. He states his pain has improved, and is currently a 3/10.  He reports turning his head to R and L increases his pain to a 6/10. He states MD ordered Vimovo (NSAID) that he will  from the pharmacy today. He denies taking any other meds or using headt/ice muscle rubs, massage, etc. He did purchase a new pillow, and feels it has helped some. Past Medical History/Comorbidities:   Mr. Ann Casanova  has a past medical history of Hypertension; Obesity (BMI 30.0-34.9) (11/16/2016); Osteoarthritis; and White coat syndrome without diagnosis of hypertension. Mr. Ann Casanova  has a past surgical history that includes pr total knee arthroplasty (Left, 01/2016). Social History/Living Environment:     Pt reports he lives in a 1 story home with his wife. He reports independence with ADLs. Prior Level of Function/Work/Activity:  Pt reports he still walks and plays golf for exercise. He denies that his pain has stopped him from doing so, He is in ,and drives ~ [id number], 5 days/week which is sometimes challenging on his painful days. Dominant Side:         RIGHT  Current Medications:       Current Outpatient Prescriptions:     naproxen sodium (ALEVE) 220 mg tablet, Take 440 mg by mouth as needed. Hold for surgery- pt reports last dose 11/16/16 @@ 0600, Disp: , Rfl:     acetaminophen (TYLENOL) 325 mg tablet, Take 650 mg by mouth every six (6) hours as needed for Pain., Disp: , Rfl:     atenolol-chlorthalidone (TENORETIC) 50-25 mg per tablet, Take 1 Tab by mouth every evening. Indications: HYPERTENSION, Disp: , Rfl:    Date Last Reviewed:  6/5/18   EXAMINATION:   Observation/Orthostatic Postural Assessment:          Pt sits/stands with minimally forward head and rounded shoulders. He's somewhat guarded with cervical rotation AROM. Palpation:          Pt c/o increased pain with palpation to central and R side C5-C6, indicating the likelihood of a mechanical rotation and/or spurring present.    Trigger points: none noted  Flexibility: Pec minor; UT; Levator Scap: min tightness throughout  Posture/Deformity:  Cervical AROM: % of normal motion in standing  Cervical spine Date:  6/5/18 Date:   Date:     Direction  Parameters Parameters Parameters   Flexion  50%; R pain     Extension  75% no pain     Rotation  R: 50% R pain  L: 75% no pain     Side bending  R: 75%  L:75% no pain     Shoulder Flex R:WFL  L:WFL     Shoulder ABD R:WFL  L:WFL     Shoulder ER/IR R:WFL  L:WFL  No shoulder pain with AROM today; in the past week, he's had R sup shoulder pain with OH activities         Strength Date:  6/5/18 Date:   Date:     Cervical/Shoulder  Parameters Parameters   Cervical Grossly 3+/5     Shoulder  Grossly 4/5 B        Myotomes: Normal and equal B throughout  Diaphragm (C4):  Deltoid/Biceps (C5):  Wrist Extensors (C6):  Triceps (C7):  Flexor Profundus (C8):    Sensation:  Normal and equal B throughout  Biceps (C5):   Alvarado Radial (C6-C7):  Alvarado Ulner (C8-T1):    Special Test/Function:  Compression: negative  Distraction:negative  Spurling's maneuver:negative  Accessory mobility:restricted C4-C6      Body Structures Involved:  1. Nerves  2. Bones  3. Joints  4. Muscles  5. Ligaments Body Functions Affected:  1. Sensory/Pain  2. Neuromusculoskeletal  3. Movement Related  Activities and Participation Affected:  1. Mobility  2. Self Care  3. Domestic Life  4. Driving   CLINICAL PRESENTATION:   CLINICAL DECISION MAKING:   Outcome Measure: Tool Used: Neck Disability Index (NDI)  Score:  Initial: 6/50=12% disability  Most Recent: X/50 (Date: -- )   Interpretation of Score: The Neck Disability Index is a revised form of the Oswestry Low Back Pain Index and is designed to measure the activities of daily living in person's with neck pain. Each section is scored on a 0-5 scale, 5 representing the greatest disability. The scores of each section are added together for a total score of 50.    Score 0 1-10 11-20 21-30 31-40 41-49 50   Modifier CH CI CJ CK CL CM CN        Medical Necessity:   · Patient is expected to demonstrate progress in strength, range of motion, balance, coordination and functional technique to improve posture, pain and cervical AROM. Orielvi Sutton TREATMENT:   (In addition to Assessment/Re-Assessment sessions the following treatments were rendered)  Pre-treatment Symptoms/Complaints:  Pt denies having neck pain or R radicular symptoms x ~ 1 week. He states mild R shoulder pain, but feels it's from an old RC injury. Pain: Initial:     0/10 stiffness only Post Session:  0/10     THERAPEUTIC EXERCISE: (0 minutes):  Exercises per grid below to improve mobility, strength, balance and coordination. Required moderate visual, verbal, manual and tactile cues to promote proper body alignment, promote proper body posture, promote proper body mechanics and promote proper body breathing techniques. Progressed resistance, range, repetitions and complexity of movement as indicated. ULTRASOUND: (10minutes): @ 3.3MHz, 100%, 1.0w/cm2 to B cervical paraspinals, B UT, and B levator scap to aide with decreasing muscle tightness and improving tissue mobility,     MANUAL THERAPY: (20minutes): Consisting of STM/MFR to B cervical paraspinals, B UT, B levator scap and R anterior scalenes, as well as, trigger point release to R UT to aide with improving tissue mobility while decreasing pain. MECHANICAL TRACTION: (15minutes): Static @ 50% speed, 2 steps, (31#/12#). Traction was used due to the patient's cervical radiculopathy in order to relieve pain in or originating from his spine. KINESIOTAPING: (0minutes): to inhibit B cervical paraspinals, (\"Y\" strip 25% pull) and B UT (2 \"I\" strips 50% pull) to aide with decreasing muscle/tissue tightness/pain.       Date:  6/5/18 Date:  6/15/18 Date:  6/19/19   Activity/Exercise Parameters Parameters Parameters   Posterior shoulder rolls 10x 15x    Scapular retraction 10x 15x    Shoulder shrugs 10x 15x    UT stretch 2 x 30sec     Sitting and Supine cervical retraction w/ chin tuck/head lift  10x each 20x supine   Stdg drwy pec stretch  3 x 20sec B    Education  HEP posture while driving and working on computer    Cervical anti-rotation against wall with yellow band behind head and alternating elbow straightening   20x                     Treatment/Session Assessment: Pt with mild R side cervical paraspinal, R UT and R levator scap tightness today. No trigger point release required. Pt is still responding well to manual therapy, US and mechanical traction. Pt is progressing nicely toward current goals. · Response to Treatment: Pt with near full AROM all directions after treatment. Pt with no pain/stiffness after visit. · Compliance with Program/Exercises: Pt reports daily compliance with above postural exercises, cervical AROM and stretching. · Recommendations/Intent for next treatment session: Continue PT 2x/week to increase cervical AROM while decreasing pain and muscle tightness to improve pt's posture and overall functional mobility. Progress postural strengthening as pt is able. Pt given NDI to fill out and return next visit. Re-assess next visit.  MD f/yohana 7/17/18    Variance to [address]: none    Future Appointments  Date Time Provider Renita Brush   7/17/2018 3:30 PM Althea Khoury PT [facility] [place]       Total Treatment Duration: 45minutes  PT Patient Time In/Time Out  Time In: 0930  Time Out: [address], PT

## 2018-07-17 ENCOUNTER — HOSPITAL ENCOUNTER (OUTPATIENT)
Dept: PHYSICAL THERAPY | Age: 53
Discharge: HOME | End: 2018-07-17
Payer: COMMERCIAL

## 2018-07-17 PROCEDURE — 97035 APP MDLTY 1+ULTRASOUND EA 15: CPT | Performed by: PHYSICAL THERAPIST

## 2018-07-17 PROCEDURE — 97140 MANUAL THERAPY 1/> REGIONS: CPT | Performed by: PHYSICAL THERAPIST

## 2018-07-17 PROCEDURE — 97012 MECHANICAL TRACTION THERAPY: CPT | Performed by: PHYSICAL THERAPIST

## 2018-07-17 NOTE — PROGRESS NOTES
Purnell Dakins  : 1965  Primary: [facility]  Secondary:  [address] at [facility]  [address], [address], [place]  [phone]   [phone]       Payor: [facility] STATE / Plan: [address] / Product Type: PPO /      OUTPATIENT PHYSICAL THERAPY:Daily Note and Progress Report 2018    ICD-10: Treatment Diagnosis: (M54.2) Cervicalgia; (M50.30) DDD Cervical; (M54.12) Cervical radiculopathy; (M62.81) muscle weakness  Precautions/Allergies:   Celebrex [celecoxib] and Pcn [penicillins]   Fall Risk Score: 1 (? 5 = High Risk)  MD Orders: Evaluate and Treat to include traction MEDICAL/REFERRING DIAGNOSIS:  Radiculopathy, cervical region [M54.12]   DATE OF ONSET: 2018  REFERRING PHYSICIAN: Amanda Fritz,*  RETURN PHYSICIAN APPOINTMENT: After Therapy     ASSESSMENT:  Mr. Jp Bourne has been seen for PT x 11 visits. He has done well, meeting almost all short term goals listed below. His cervical AROM has returned to [facility] throughout with manageable pain (< 3/10). He states he still wakes intermittently due to neck pain, but not every night. He no longer has pain while playing golf, and rarely has it after playing golf. He scored a 6% disability rating on the NDI. Please see full document below for detailed progress. I will continue to progress pt toward his unmet short and long term goals. PROBLEM LIST (Impacting functional limitations):  1. Decreased Strength  2. Increased Pain  3. Decreased Flexibility/Joint Mobility  4. Decreased Knowledge of Precautions  5. Decreased [place] with Home Exercise Program INTERVENTIONS PLANNED:  1. Cold  2. Electrical Stimulation  3. Heat  4. Home Exercise Program (HEP)  5. Manual Therapy  6. Neuromuscular Re-education/Strengthening  7. Range of Motion (ROM)  8. Therapeutic Exercise/Strengthening  9. Transcutaneous Electrical Nerve Stimulation (TENS)  10. Ultrasound (US)  11.  Mechanical Traction 12. Kinesiotaping   TREATMENT PLAN:  Effective Dates: 6/5/2018 TO 9/3/2018 (90 days). Frequency/Duration: 2 times a week for 90 Days  GOALS: (Goals have been discussed and agreed upon with patient.)  Short-Term Functional Goals: Time Frame: 4-6 weeks) (7-17-18) (Assessed 7-17-18)  1. Pt will be compliant and independent with HEP and demonstrating correct, erect posture without an increase in neck pain. ---------------------------------MET  2. Pt will improve NDI score to 5/50 to increase pt's overall functional mobility.--------------------------------------------------------------------------------------------------MET  3. Pt will subjectively report no longer waking during the night due to neck pain. ----------------------------------------Partially MET, pt wakes ~ 3x/week due to neck pain  4. Pt to subjectively report a decrease in pain to 3/10 @ worst to increase pt's ease with driving and reading. -------------------------------------------------------MET  5. Pt will improve Cervical AROM to Cervical Flex: 75% and R Rotation 75% with pain 3/10 @ worst that occurs only on occasion.-----------MET    Discharge Goals: Time Frame: 8-12 weeks (9/3/18)  1. Pt will improve NDI score to < 4/50 to increase pt's overall functional mobility.----------------------------------------------------------------------------MET  2. Pt will subjectively report little to no neck pain when turning his head L or R to allow him greater ease with performing his job in . 3. Pt will improve cervical AROM to > 80% all directions with min to no pain. 6. Pt will deny R superior shoulder pain with OH reaching/lifting. 4. Pt will subjectively report a decrease in pain to 1/10 @ worst to allow pt greater ease with reaching/working overhead. 5. Pt will be discharged from PT to [facility].   Rehabilitation Potential For Stated Goals: Good  Regarding [place] therapy, I certify that the treatment plan above will be carried out by a therapist or under their direction. Thank you for this referral,  Adrianna Arguelles PT                 The information in this section was collected on 6/5/18 (except where otherwise noted). HISTORY:   History of Present Injury/Illness (Reason for Referral):  Pt reports he had no neck or R shoulder/UE pain until he was traveling out of town at with work and slept in a hotel. He states he woke up with a stiff neck, however, his pain proceeded to worsen later in the day when he got on a flight and lifted his carry on to put it in an overhead bin. He states at that time, his pain was a 7-8/10. He states his pain has improved, and is currently a 3/10. He reports turning his head to R and L increases his pain to a 6/10. He states MD ordered Vimovo (NSAID) that he will  from the pharmacy today. He denies taking any other meds or using headt/ice muscle rubs, massage, etc. He did purchase a new pillow, and feels it has helped some. Past Medical History/Comorbidities:   Mr. Brenda Acosta  has a past medical history of Hypertension; Obesity (BMI 30.0-34.9) (11/16/2016); Osteoarthritis; and White coat syndrome without diagnosis of hypertension. Mr. Brenda Acosta  has a past surgical history that includes pr total knee arthroplasty (Left, 01/2016). Social History/Living Environment:     Pt reports he lives in a 1 story home with his wife. He reports independence with ADLs. Prior Level of Function/Work/Activity:  Pt reports he still walks and plays golf for exercise. He denies that his pain has stopped him from doing so, He is in ,and drives ~ [id number], 5 days/week which is sometimes challenging on his painful days. Dominant Side:         RIGHT  Current Medications:       Current Outpatient Prescriptions:     naproxen sodium (ALEVE) 220 mg tablet, Take 440 mg by mouth as needed.  Hold for surgery- pt reports last dose 11/16/16 @@ 0600, Disp: , Rfl:     acetaminophen (TYLENOL) 325 mg tablet, Take 650 mg by mouth every six (6) hours as needed for Pain., Disp: , Rfl:     atenolol-chlorthalidone (TENORETIC) 50-25 mg per tablet, Take 1 Tab by mouth every evening. Indications: HYPERTENSION, Disp: , Rfl:    Date Last Reviewed:  6/5/18   EXAMINATION:   Observation/Orthostatic Postural Assessment:          Pt sits/stands with minimally forward head and rounded shoulders. He's somewhat guarded with cervical rotation AROM. Palpation:          Pt c/o increased pain with palpation to central and R side C5-C6, indicating the likelihood of a mechanical rotation and/or spurring present. Trigger points: none noted  Flexibility: Pec minor; UT; Levator Scap: min tightness throughout  Posture/Deformity:  Cervical AROM: % of normal motion in standing  Cervical spine Date:  6/5/18 Date:  7/17/18 Date:     Direction  Parameters Parameters Parameters   Flexion  50%; R pain 75%; tightness R    Extension  75% no pain 75% no pain    Rotation  R: 50% R pain  L: 75% no pain 75% B tightness on R when rotating L    Side bending  R: 75%  L:75% no pain 75% B    Shoulder Flex R:WFL  L:WFL     Shoulder ABD R:WFL  L:WFL     Shoulder ER/IR R:WFL  L:WFL  No shoulder pain with AROM today; in the past week, he's had R sup shoulder pain with OH activities         Strength Date:  6/5/18 Date:   Date:     Cervical/Shoulder  Parameters Parameters   Cervical Grossly 3+/5     Shoulder  Grossly 4/5 B        Myotomes: Normal and equal B throughout  Diaphragm (C4):  Deltoid/Biceps (C5):  Wrist Extensors (C6):  Triceps (C7):  Flexor Profundus (C8):    Sensation:  Normal and equal B throughout  Biceps (C5):   Alvarado Radial (C6-C7):  Alvarado Ulner (C8-T1):    Special Test/Function:  Compression: negative  Distraction:negative  Spurling's maneuver:negative  Accessory mobility:restricted C4-C6      Body Structures Involved:  1. Nerves  2. Bones  3. Joints  4. Muscles  5. Ligaments Body Functions Affected:  1. Sensory/Pain  2. Neuromusculoskeletal  3.  Movement Related Activities and Participation Affected:  1. Mobility  2. Self Care  3. Domestic Life  4. Driving   CLINICAL PRESENTATION:   CLINICAL DECISION MAKING:   Outcome Measure: Tool Used: Neck Disability Index (NDI)  Score:  Initial: 6/50=12% disability  Most Recent: 3/50=6% disability   (Date: 7/17/18 )   Interpretation of Score: The Neck Disability Index is a revised form of the Oswestry Low Back Pain Index and is designed to measure the activities of daily living in person's with neck pain. Each section is scored on a 0-5 scale, 5 representing the greatest disability. The scores of each section are added together for a total score of 50. Score 0 1-10 11-20 21-30 31-40 41-49 50   Modifier CH CI CJ CK CL CM CN        Medical Necessity:   · Patient is expected to demonstrate progress in strength, range of motion, balance, coordination and functional technique to improve posture, pain and cervical AROM. Russell Mar TREATMENT:   (In addition to Assessment/Re-Assessment sessions the following treatments were rendered)  Pre-treatment Symptoms/Complaints:  Pt reports little pain over the last week. He states his pain on average would be 0-1/10, and 2/10 with increased driving. Pain: Initial:     0/10 stiffness only Post Session:  0/10     THERAPEUTIC EXERCISE: (0 minutes):  Exercises per grid below to improve mobility, strength, balance and coordination. Required moderate visual, verbal, manual and tactile cues to promote proper body alignment, promote proper body posture, promote proper body mechanics and promote proper body breathing techniques. Progressed resistance, range, repetitions and complexity of movement as indicated.     ULTRASOUND: (10minutes): @ 3.3MHz, 100%, 1.0w/cm2 to B cervical paraspinals, B UT, and B levator scap to aide with decreasing muscle tightness and improving tissue mobility,     MANUAL THERAPY: (30minutes): Consisting of STM/MFR to B cervical paraspinals, B UT, B levator scap and R anterior scalenes, as well as, trigger point release to R UT to aide with improving tissue mobility while decreasing pain. MECHANICAL TRACTION: (15minutes): Static @ 50% speed, 2 steps, (31#/12#). Traction was used due to the patient's cervical radiculopathy in order to relieve pain in or originating from his spine. KINESIOTAPING: (0minutes): to inhibit B cervical paraspinals, (\"Y\" strip 25% pull) and B UT (2 \"I\" strips 50% pull) to aide with decreasing muscle/tissue tightness/pain. AROM measurements and NDI: (5 minutes):      Date:  6/5/18 Date:  6/15/18 Date:  6/19/19   Activity/Exercise Parameters Parameters Parameters   Posterior shoulder rolls 10x 15x    Scapular retraction 10x 15x    Shoulder shrugs 10x 15x    UT stretch 2 x 30sec     Sitting and Supine cervical retraction w/ chin tuck/head lift  10x each 20x supine   Stdg drwy pec stretch  3 x 20sec B    Education  HEP posture while driving and working on computer    Cervical anti-rotation against wall with yellow band behind head and alternating elbow straightening   20x                     Treatment/Session Assessment: See above    · Response to Treatment: Pt with near full AROM all directions after treatment. Pt with no pain/stiffness after visit. · Compliance with Program/Exercises: Pt reports daily compliance with above postural exercises, cervical AROM and stretching. · Recommendations/Intent for next treatment session: Continue PT 1x/week to increase cervical AROM while decreasing pain and muscle tightness to improve pt's posture and overall functional mobility.      Variance to POC: none    Future Appointments  Date Time Provider Renita Brush   7/26/2018 3:15 PM KAR Osei   8/2/2018 3:15 PM KAR Osei       Total Treatment Duration: 60minutes  PT Patient Time In/Time Out  Time In: 7005  Time Out: 175 [facility],

## 2018-07-26 ENCOUNTER — APPOINTMENT (OUTPATIENT)
Dept: PHYSICAL THERAPY | Age: 53
End: 2018-07-26
Payer: COMMERCIAL

## 2018-08-24 NOTE — PROGRESS NOTES
Ashley Matos  : 1965  Primary: Ally Mario [facility]  Secondary:  [address]  at [facility]  [address], [address], [place] [address]  [phone]   [phone]       Payor: Abilio Villeda / Plan: [address] / Product Type: PPO /      OUTPATIENT PHYSICAL THERAPY:Discontinuation Summary 2018    ICD-10: Treatment Diagnosis: (M54.2) Cervicalgia; (M50.30) DDD Cervical; (M54.12) Cervical radiculopathy; (M62.81) muscle weakness  Precautions/Allergies:   Celebrex [celecoxib] and Pcn [penicillins]   Fall Risk Score: 1 (? 5 = High Risk)  MD Orders: Evaluate and Treat to include traction MEDICAL/REFERRING DIAGNOSIS:  Radiculopathy, cervical region [M54.12]   DATE OF ONSET: 2018  REFERRING PHYSICIAN: Ned Contreras,*  RETURN PHYSICIAN APPOINTMENT: After Therapy     Ashley Matos has been seen in physical therapy from 18 to 18 for 11 visits. Treatment has been discontinued at this time due to Expiration of plan of care without further referral by ordering physician and patient failing to return for additional treatment. The below goals were met prior to discontinuation. Pt is compliant and independent with his HEP. Pt is DC'd from PT to HEP at this time. Thank you for this referral.        PROBLEM LIST (Impacting functional limitations):  1. Decreased Strength  2. Increased Pain  3. Decreased Flexibility/Joint Mobility  4. Decreased Knowledge of Precautions  5. Decreased [place] with Home Exercise Program INTERVENTIONS PLANNED:  1. Cold  2. Electrical Stimulation  3. Heat  4. Home Exercise Program (HEP)  5. Manual Therapy  6. Neuromuscular Re-education/Strengthening  7. Range of Motion (ROM)  8. Therapeutic Exercise/Strengthening  9. Transcutaneous Electrical Nerve Stimulation (TENS)  10. Ultrasound (US)  11. Mechanical Traction   12. Kinesiotaping   TREATMENT PLAN:  Effective Dates: 2018 TO 9/3/2018 (90 days). Frequency/Duration: 2 times a week for 90 Days  GOALS: (Goals have been discussed and agreed upon with patient.)  Short-Term Functional Goals: Time Frame: 4-6 weeks) (7-17-18) (Assessed 7-17-18)  1. Pt will be compliant and independent with HEP and demonstrating correct, erect posture without an increase in neck pain. ---------------------------------MET  2. Pt will improve NDI score to 5/50 to increase pt's overall functional mobility.--------------------------------------------------------------------------------------------------MET  3. Pt will subjectively report no longer waking during the night due to neck pain. ----------------------------------------Partially MET, pt wakes ~ 3x/week due to neck pain  4. Pt to subjectively report a decrease in pain to 3/10 @ worst to increase pt's ease with driving and reading. -------------------------------------------------------MET  5. Pt will improve Cervical AROM to Cervical Flex: 75% and R Rotation 75% with pain 3/10 @ worst that occurs only on occasion.-----------MET    Discharge Goals: Time Frame: 8-12 weeks (9/3/18)  1. Pt will improve NDI score to < 4/50 to increase pt's overall functional mobility.----------------------------------------------------------------------------MET  2. Pt will subjectively report little to no neck pain when turning his head L or R to allow him greater ease with performing his job in . ----MET  3. Pt will improve cervical AROM to > 80% all directions with min to no pain. ------------------------------------------Partially MET  6. Pt will deny R superior shoulder pain with OH reaching/lifting. ------------------------------------------MET  4. Pt will subjectively report a decrease in pain to 1/10 @ worst to allow pt greater ease with reaching/working overhead. --------------------MET  5.  Pt will be discharged from PT to HEP.-------------------------------------------------------------------------------------------MET  Rehabilitation Potential For Stated Goals: Good  Regarding Alyson Hawkins therapy, I certify that the treatment plan above will be carried out by a therapist or under their direction.   Thank you for this referral,  Parvin Devries, PT

## 2018-12-07 ENCOUNTER — APPOINTMENT (RX ONLY)
Dept: URBAN - METROPOLITAN AREA CLINIC 24 | Facility: CLINIC | Age: 53
Setting detail: DERMATOLOGY
End: 2018-12-07

## 2018-12-07 DIAGNOSIS — L82.1 OTHER SEBORRHEIC KERATOSIS: ICD-10-CM

## 2018-12-07 DIAGNOSIS — L57.0 ACTINIC KERATOSIS: ICD-10-CM

## 2018-12-07 DIAGNOSIS — D485 NEOPLASM OF UNCERTAIN BEHAVIOR OF SKIN: ICD-10-CM

## 2018-12-07 PROBLEM — D48.5 NEOPLASM OF UNCERTAIN BEHAVIOR OF SKIN: Status: ACTIVE | Noted: 2018-12-07

## 2018-12-07 PROCEDURE — ? BIOPSY BY SHAVE METHOD

## 2018-12-07 PROCEDURE — 99201: CPT | Mod: 25

## 2018-12-07 PROCEDURE — 11100: CPT | Mod: 59

## 2018-12-07 PROCEDURE — ? COUNSELING

## 2018-12-07 PROCEDURE — 17003 DESTRUCT PREMALG LES 2-14: CPT

## 2018-12-07 PROCEDURE — 17000 DESTRUCT PREMALG LESION: CPT

## 2018-12-07 PROCEDURE — ? LIQUID NITROGEN

## 2018-12-07 ASSESSMENT — LOCATION DETAILED DESCRIPTION DERM
LOCATION DETAILED: LEFT CENTRAL TEMPLE
LOCATION DETAILED: RIGHT INFERIOR TEMPLE
LOCATION DETAILED: MID-FRONTAL SCALP
LOCATION DETAILED: RIGHT CENTRAL ZYGOMA

## 2018-12-07 ASSESSMENT — LOCATION SIMPLE DESCRIPTION DERM
LOCATION SIMPLE: RIGHT TEMPLE
LOCATION SIMPLE: RIGHT ZYGOMA
LOCATION SIMPLE: LEFT TEMPLE
LOCATION SIMPLE: ANTERIOR SCALP

## 2018-12-07 ASSESSMENT — LOCATION ZONE DERM
LOCATION ZONE: FACE
LOCATION ZONE: SCALP

## 2018-12-07 NOTE — PROCEDURE: BIOPSY BY SHAVE METHOD
Additional Anesthesia Volume In Cc (Will Not Render If 0): 0
Type Of Destruction Used: Curettage
Hemostasis: Aluminum Chloride
Cryotherapy Text: The wound bed was treated with cryotherapy after the biopsy was performed.
Anesthesia Type: 1% lidocaine with epinephrine and a 1:10 solution of 8.4% sodium bicarbonate
Accession #: PC
Depth Of Biopsy: dermis
Bill For Surgical Tray: no
Electrodesiccation And Curettage Text: The wound bed was treated with electrodesiccation and curettage after the biopsy was performed.
Dressing: bandage
Detail Level: Detailed
Was A Bandage Applied: Yes
Electrodesiccation Text: The wound bed was treated with electrodesiccation after the biopsy was performed.
Silver Nitrate Text: The wound bed was treated with silver nitrate after the biopsy was performed.
Wound Care: Petrolatum
Billing Type: Third-Party Bill
Biopsy Method: Dermablade
Biopsy Type: H and E
Curettage Text: The wound bed was treated with curettage after the biopsy was performed.
Anesthesia Volume In Cc: 0.5

## 2018-12-07 NOTE — PROCEDURE: LIQUID NITROGEN
Number Of Freeze-Thaw Cycles: 1 freeze-thaw cycle
Post-Care Instructions: I reviewed with the patient in detail post-care instructions. Patient is to wear sunprotection, and avoid picking at any of the treated lesions. Pt may apply Vaseline to crusted or scabbing areas.
Consent: The patient's consent was obtained including but not limited to risks of crusting, scabbing, blistering, scarring, darker or lighter pigmentary change, recurrence, incomplete removal and infection.
Detail Level: Simple
Duration Of Freeze Thaw-Cycle (Seconds): 4
Render Post-Care Instructions In Note?: no

## 2019-01-28 ENCOUNTER — APPOINTMENT (RX ONLY)
Dept: URBAN - METROPOLITAN AREA CLINIC 24 | Facility: CLINIC | Age: 54
Setting detail: DERMATOLOGY
End: 2019-01-28

## 2019-01-28 PROBLEM — C44.329 SQUAMOUS CELL CARCINOMA OF SKIN OF OTHER PARTS OF FACE: Status: ACTIVE | Noted: 2019-01-28

## 2019-01-28 PROCEDURE — ? MOHS SURGERY

## 2019-01-28 PROCEDURE — 17311 MOHS 1 STAGE H/N/HF/G: CPT

## 2019-01-28 PROCEDURE — 12052 INTMD RPR FACE/MM 2.6-5.0 CM: CPT

## 2019-01-28 NOTE — PROCEDURE: MOHS SURGERY
Quadrants Positive?: 0
Keystone Flap Text: The defect edges were debeveled with a #15 scalpel blade.  Given the location of the defect, shape of the defect a keystone flap was deemed most appropriate.  Using a sterile surgical marker, an appropriate keystone flap was drawn incorporating the defect, outlining the appropriate donor tissue and placing the expected incisions within the relaxed skin tension lines where possible. The area thus outlined was incised deep to adipose tissue with a #15 scalpel blade.  The skin margins were undermined to an appropriate distance in all directions around the primary defect and laterally outward around the flap utilizing iris scissors.
O-T Advancement Flap Text: The defect edges were debeveled with a #15 scalpel blade.  Given the location of the defect, shape of the defect and the proximity to free margins an O-T advancement flap was deemed most appropriate.  Using a sterile surgical marker, an appropriate advancement flap was drawn incorporating the defect and placing the expected incisions within the relaxed skin tension lines where possible.    The area thus outlined was incised deep to adipose tissue with a #15 scalpel blade.  The skin margins were undermined to an appropriate distance in all directions utilizing iris scissors.
Ear Wedge Repair Text: A wedge excision was completed by carrying down an excision through the full thickness of the ear and cartilage with an inward facing Burow's triangle. The wound was then closed in a layered fashion.
Dermal Autograft Text: The defect edges were debeveled with a #15 scalpel blade.  Given the location of the defect, shape of the defect and the proximity to free margins a dermal autograft was deemed most appropriate.  Using a sterile surgical marker, the primary defect shape was transferred to the donor site. The area thus outlined was incised deep to adipose tissue with a #15 scalpel blade.  The harvested graft was then trimmed of adipose and epidermal tissue until only dermis was left.  The skin graft was then placed in the primary defect and oriented appropriately.
Ear Star Wedge Flap Text: The defect edges were debeveled with a #15 blade scalpel.  Given the location of the defect and the proximity to free margins (helical rim) an ear star wedge flap was deemed most appropriate.  Using a sterile surgical marker, the appropriate flap was drawn incorporating the defect and placing the expected incisions between the helical rim and antihelix where possible.  The area thus outlined was incised through and through with a #15 scalpel blade.
Eye Shield Used: No
Interpolation Flap Text: A decision was made to reconstruct the defect utilizing an interpolation axial flap and a staged reconstruction.  A telfa template was made of the defect.  This telfa template was then used to outline the interpolation flap.  The donor area for the pedicle flap was then injected with anesthesia.  The flap was excised through the skin and subcutaneous tissue down to the layer of the underlying musculature.  The interpolation flap was carefully excised within this deep plane to maintain its blood supply.  The edges of the donor site were undermined.   The donor site was closed in a primary fashion.  The pedicle was then rotated into position and sutured.  Once the tube was sutured into place, adequate blood supply was confirmed with blanching and refill.  The pedicle was then wrapped with xeroform gauze and dressed appropriately with a telfa and gauze bandage to ensure continued blood supply and protect the attached pedicle.
Referred To Asc For Closure Text (Leave Blank If You Do Not Want): After obtaining clear surgical margins the patient was sent to an ASC for surgical repair.  The patient understands they will receive post-surgical care and follow-up from the ASC physician.
Show Special Stain Variables In The Stage Tabs: Yes
Consent 1/Introductory Paragraph: The rationale for Mohs was explained to the patient and consent was obtained. The risks, benefits and alternatives to therapy were discussed in detail. Specifically, the risks of infection, scarring, bleeding, prolonged wound healing, incomplete removal, allergy to anesthesia, nerve injury (both sensory which can be permanent and motor which is permanent) and recurrence were addressed. Prior to the procedure, the treatment site was clearly identified and confirmed by the patient.
Bi-Rhombic Flap Text: The defect edges were debeveled with a #15 scalpel blade.  Given the location of the defect and the proximity to free margins a bi-rhombic flap was deemed most appropriate.  Using a sterile surgical marker, an appropriate rhombic flap was drawn incorporating the defect. The area thus outlined was incised deep to adipose tissue with a #15 scalpel blade.  The skin margins were undermined to an appropriate distance in all directions utilizing iris scissors.
Cheiloplasty (Less Than 50%) Text: A decision was made to reconstruct the defect with a  cheiloplasty.  The defect was undermined extensively.  Additional obicularis oris muscle was excised with a 15 blade scalpel.  The defect was converted into a full thickness wedge, of less than 50% of the vertical height of the lip, to facilite a better cosmetic result.  Small vessels were then tied off with 5-0 monocyrl. The obicularis oris, superficial fascia, adipose and dermis were then reapproximated.  After the deeper layers were approximated the epidermis was reapproximated with particular care given to realign the vermillion border.
Stage 8: Additional Anesthesia Type: 1% lidocaine with epinephrine
Previous Accession (Optional): [id number]
Number Of Stages: 1
Bcc Infiltrative Histology Text: There were numerous aggregates of basaloid cells demonstrating an infiltrative pattern.
Provider Procedure Text (D): After obtaining clear surgical margins the defect was repaired by another provider.
Consent 3/Introductory Paragraph: I gave the patient a chance to ask questions they had about the procedure.  Following this I explained the Mohs procedure and consent was obtained. The risks, benefits and alternatives to therapy were discussed in detail. Specifically, the risks of infection, scarring, bleeding, prolonged wound healing, incomplete removal, allergy to anesthesia, nerve injury and recurrence were addressed. Prior to the procedure, the treatment site was clearly identified and confirmed by the patient. All components of Universal Protocol/PAUSE Rule completed.
Consent (Nose)/Introductory Paragraph: The rationale for Mohs was explained to the patient and consent was obtained. The risks, benefits and alternatives to therapy were discussed in detail. Specifically, the risks of nasal deformity, changes in the flow of air through the nose, infection, scarring, bleeding, prolonged wound healing, incomplete removal, allergy to anesthesia, nerve injury and recurrence were addressed. Prior to the procedure, the treatment site was clearly identified and confirmed by the patient. All components of Universal Protocol/PAUSE Rule completed.
Partial Purse String (Simple) Text: Given the location of the defect and the characteristics of the surrounding skin a simple purse string closure was deemed most appropriate.  Undermining was performed circumfirentially around the surgical defect.  A purse string suture was then placed and tightened. Wound tension only allowed a partial closure of the circular defect.
Closure 2 Information: This tab is for additional flaps and grafts, including complex repair and grafts and complex repair and flaps. You can also specify a different location for the additional defect, if the location is the same you do not need to select a new one. We will insert the automated text for the repair you select below just as we do for solitary flaps and grafts. Please note that at this time if you select a location with a different insurance zone you will need to override the ICD10 and CPT if appropriate.
Non-Graft Cartilage Fenestration Text: The cartilage was fenestrated with a 2mm punch biopsy to help facilitate healing.
O-L Flap Text: The defect edges were debeveled with a #15 scalpel blade.  Given the location of the defect, shape of the defect and the proximity to free margins an O-L flap was deemed most appropriate.  Using a sterile surgical marker, an appropriate advancement flap was drawn incorporating the defect and placing the expected incisions within the relaxed skin tension lines where possible.    The area thus outlined was incised deep to adipose tissue with a #15 scalpel blade.  The skin margins were undermined to an appropriate distance in all directions utilizing iris scissors.
Rotation Flap Text: The defect edges were debeveled with a #15 scalpel blade.  Given the location of the defect, shape of the defect and the proximity to free margins a rotation flap was deemed most appropriate.  Using a sterile surgical marker, an appropriate rotation flap was drawn incorporating the defect and placing the expected incisions within the relaxed skin tension lines where possible.    The area thus outlined was incised deep to adipose tissue with a #15 scalpel blade.  The skin margins were undermined to an appropriate distance in all directions utilizing iris scissors.
Composite Graft Text: The defect edges were debeveled with a #15 scalpel blade.  Given the location of the defect, shape of the defect, the proximity to free margins and the fact the defect was full thickness a composite graft was deemed most appropriate.  The defect was outline and then transferred to the donor site.  A full thickness graft was then excised from the donor site. The graft was then placed in the primary defect, oriented appropriately and then sutured into place.  The secondary defect was then repaired using a primary closure.
O-T Plasty Text: The defect edges were debeveled with a #15 scalpel blade.  Given the location of the defect, shape of the defect and the proximity to free margins an O-T plasty was deemed most appropriate.  Using a sterile surgical marker, an appropriate O-T plasty was drawn incorporating the defect and placing the expected incisions within the relaxed skin tension lines where possible.    The area thus outlined was incised deep to adipose tissue with a #15 scalpel blade.  The skin margins were undermined to an appropriate distance in all directions utilizing iris scissors.
Advancement-Rotation Flap Text: The defect edges were debeveled with a #15 scalpel blade.  Given the location of the defect, shape of the defect and the proximity to free margins an advancement-rotation flap was deemed most appropriate.  Using a sterile surgical marker, an appropriate flap was drawn incorporating the defect and placing the expected incisions within the relaxed skin tension lines where possible. The area thus outlined was incised deep to adipose tissue with a #15 scalpel blade.  The skin margins were undermined to an appropriate distance in all directions utilizing iris scissors.
Consent Type: Consent 1 (Standard)
Referred To Mid-Level For Closure Text (Leave Blank If You Do Not Want): After obtaining clear surgical margins the patient was sent to a mid-level provider for surgical repair.  The patient understands they will receive post-surgical care and follow-up from the mid-level provider.
Crescentic Advancement Flap Text: The defect edges were debeveled with a #15 scalpel blade.  Given the location of the defect and the proximity to free margins a crescentic advancement flap was deemed most appropriate.  Using a sterile surgical marker, the appropriate advancement flap was drawn incorporating the defect and placing the expected incisions within the relaxed skin tension lines where possible.    The area thus outlined was incised deep to adipose tissue with a #15 scalpel blade.  The skin margins were undermined to an appropriate distance in all directions utilizing iris scissors.
A-T Advancement Flap Text: The defect edges were debeveled with a #15 scalpel blade.  Given the location of the defect, shape of the defect and the proximity to free margins an A-T advancement flap was deemed most appropriate.  Using a sterile surgical marker, an appropriate advancement flap was drawn incorporating the defect and placing the expected incisions within the relaxed skin tension lines where possible.    The area thus outlined was incised deep to adipose tissue with a #15 scalpel blade.  The skin margins were undermined to an appropriate distance in all directions utilizing iris scissors.
Anesthesia Volume In Cc: 3
Simple / Intermediate / Complex Repair - Final Wound Length In Cm: 3.5
Dressing (No Sutures): dry sterile dressing
Bilobed Flap Text: The defect edges were debeveled with a #15 scalpel blade.  Given the location of the defect and the proximity to free margins a bilobe flap was deemed most appropriate.  Using a sterile surgical marker, an appropriate bilobe flap drawn around the defect.    The area thus outlined was incised deep to adipose tissue with a #15 scalpel blade.  The skin margins were undermined to an appropriate distance in all directions utilizing iris scissors.
Rhomboid Transposition Flap Text: The defect edges were debeveled with a #15 scalpel blade.  Given the location of the defect and the proximity to free margins a rhomboid transposition flap was deemed most appropriate.  Using a sterile surgical marker, an appropriate rhomboid flap was drawn incorporating the defect.    The area thus outlined was incised deep to adipose tissue with a #15 scalpel blade.  The skin margins were undermined to an appropriate distance in all directions utilizing iris scissors.
Estimated Blood Loss (Cc): minimal
Unna Boot Text: An Unna boot was placed to help immobilize the limb and facilitate more rapid healing.
Full Thickness Lip Wedge Repair (Flap) Text: Given the location of the defect and the proximity to free margins a full thickness wedge repair was deemed most appropriate.  Using a sterile surgical marker, the appropriate repair was drawn incorporating the defect and placing the expected incisions perpendicular to the vermillion border.  The vermillion border was also meticulously outlined to ensure appropriate reapproximation during the repair.  The area thus outlined was incised through and through with a #15 scalpel blade.  The muscularis and dermis were reaproximated with deep sutures following hemostasis. Care was taken to realign the vermillion border before proceeding with the superficial closure.  Once the vermillion was realigned the superfical and mucosal closure was finished.
Burow's Advancement Flap Text: The defect edges were debeveled with a #15 scalpel blade.  Given the location of the defect and the proximity to free margins a Burow's advancement flap was deemed most appropriate.  Using a sterile surgical marker, the appropriate advancement flap was drawn incorporating the defect and placing the expected incisions within the relaxed skin tension lines where possible.    The area thus outlined was incised deep to adipose tissue with a #15 scalpel blade.  The skin margins were undermined to an appropriate distance in all directions utilizing iris scissors.
Anesthesia Volume In Cc: 1.5
Trilobed Flap Text: The defect edges were debeveled with a #15 scalpel blade.  Given the location of the defect and the proximity to free margins a trilobed flap was deemed most appropriate.  Using a sterile surgical marker, an appropriate trilobed flap drawn around the defect.    The area thus outlined was incised deep to adipose tissue with a #15 scalpel blade.  The skin margins were undermined to an appropriate distance in all directions utilizing iris scissors.
Manual Repair Warning Statement: We plan on removing the manually selected variable below in favor of our much easier automatic structured text blocks found in the previous tab. We decided to do this to help make the flow better and give you the full power of structured data. Manual selection is never going to be ideal in our platform and I would encourage you to avoid using manual selection from this point on, especially since I will be sunsetting this feature. It is important that you do one of two things with the customized text below. First, you can save all of the text in a word file so you can have it for future reference. Second, transfer the text to the appropriate area in the Library tab. Lastly, if there is a flap or graft type which we do not have you need to let us know right away so I can add it in before the variable is hidden. No need to panic, we plan to give you roughly 6 months to make the change.
Partial Purse String (Intermediate) Text: Given the location of the defect and the characteristics of the surrounding skin an intermediate purse string closure was deemed most appropriate.  Undermining was performed circumfirentially around the surgical defect.  A purse string suture was then placed and tightened. Wound tension only allowed a partial closure of the circular defect.
Plastic Surgeon Procedure Text (A): After obtaining clear surgical margins the patient was sent to plastics for surgical repair.  The patient understands they will receive post-surgical care and follow-up from the referring physician's office.
Island Pedicle Flap-Requiring Vessel Identification Text: The defect edges were debeveled with a #15 scalpel blade.  Given the location of the defect, shape of the defect and the proximity to free margins an island pedicle advancement flap was deemed most appropriate.  Using a sterile surgical marker, an appropriate advancement flap was drawn, based on the axial vessel mentioned above, incorporating the defect, outlining the appropriate donor tissue and placing the expected incisions within the relaxed skin tension lines where possible.    The area thus outlined was incised deep to adipose tissue with a #15 scalpel blade.  The skin margins were undermined to an appropriate distance in all directions around the primary defect and laterally outward around the island pedicle utilizing iris scissors.  There was minimal undermining beneath the pedicle flap.
Z Plasty Text: The lesion was extirpated to the level of the fat with a #15 scalpel blade.  Given the location of the defect, shape of the defect and the proximity to free margins a Z-plasty was deemed most appropriate for repair.  Using a sterile surgical marker, the appropriate transposition arms of the Z-plasty were drawn incorporating the defect and placing the expected incisions within the relaxed skin tension lines where possible.    The area thus outlined was incised deep to adipose tissue with a #15 scalpel blade.  The skin margins were undermined to an appropriate distance in all directions utilizing iris scissors.  The opposing transposition arms were then transposed into place in opposite direction and anchored with interrupted buried subcutaneous sutures.
Banner Transposition Flap Text: The defect edges were debeveled with a #15 scalpel blade.  Given the location of the defect and the proximity to free margins a Banner transposition flap was deemed most appropriate.  Using a sterile surgical marker, an appropriate flap drawn around the defect. The area thus outlined was incised deep to adipose tissue with a #15 scalpel blade.  The skin margins were undermined to an appropriate distance in all directions utilizing iris scissors.
Cheiloplasty (Complex) Text: A decision was made to reconstruct the defect with a  cheiloplasty.  The defect was undermined extensively.  Additional obicularis oris muscle was excised with a 15 blade scalpel.  The defect was converted into a full thickness wedge to facilite a better cosmetic result.  Small vessels were then tied off with 5-0 monocyrl. The obicularis oris, superficial fascia, adipose and dermis were then reapproximated.  After the deeper layers were approximated the epidermis was reapproximated with particular care given to realign the vermillion border.
Advancement Flap (Single) Text: The defect edges were debeveled with a #15 scalpel blade.  Given the location of the defect and the proximity to free margins a single advancement flap was deemed most appropriate.  Using a sterile surgical marker, an appropriate advancement flap was drawn incorporating the defect and placing the expected incisions within the relaxed skin tension lines where possible.    The area thus outlined was incised deep to adipose tissue with a #15 scalpel blade.  The skin margins were undermined to an appropriate distance in all directions utilizing iris scissors.
W Plasty Text: The lesion was extirpated to the level of the fat with a #15 scalpel blade.  Given the location of the defect, shape of the defect and the proximity to free margins a W-plasty was deemed most appropriate for repair.  Using a sterile surgical marker, the appropriate transposition arms of the W-plasty were drawn incorporating the defect and placing the expected incisions within the relaxed skin tension lines where possible.    The area thus outlined was incised deep to adipose tissue with a #15 scalpel blade.  The skin margins were undermined to an appropriate distance in all directions utilizing iris scissors.  The opposing transposition arms were then transposed into place in opposite direction and anchored with interrupted buried subcutaneous sutures.
Intermediate Repair Preamble Text (Leave Blank If You Do Not Want): Undermining was performed with blunt dissection.
Otolaryngologist Procedure Text (D): After obtaining clear surgical margins the patient was sent to otolaryngology for surgical repair.  The patient understands they will receive post-surgical care and follow-up from the referring physician's office.
Subsequent Stages Histo Method Verbiage: Using a similar technique to that described above, a thin layer of tissue was removed from all areas where tumor was visible on the previous stage.  The tissue was again oriented, mapped, dyed, and processed as above.
Xenograft Text: The defect edges were debeveled with a #15 scalpel blade.  Given the location of the defect, shape of the defect and the proximity to free margins a xenograft was deemed most appropriate.  The graft was then trimmed to fit the size of the defect.  The graft was then placed in the primary defect and oriented appropriately.
Melolabial Interpolation Flap Text: A decision was made to reconstruct the defect utilizing an interpolation axial flap and a staged reconstruction.  A telfa template was made of the defect.  This telfa template was then used to outline the melolabial interpolation flap.  The donor area for the pedicle flap was then injected with anesthesia.  The flap was excised through the skin and subcutaneous tissue down to the layer of the underlying musculature.  The pedicle flap was carefully excised within this deep plane to maintain its blood supply.  The edges of the donor site were undermined.   The donor site was closed in a primary fashion.  The pedicle was then rotated into position and sutured.  Once the tube was sutured into place, adequate blood supply was confirmed with blanching and refill.  The pedicle was then wrapped with xeroform gauze and dressed appropriately with a telfa and gauze bandage to ensure continued blood supply and protect the attached pedicle.
Mauc Instructions: By selecting yes to the question below the MAUC number will be added into the note.  This will be calculated automatically based on the diagnosis chosen, the size entered, the body zone selected (H,M,L) and the specific indications you chose. You will also have the option to override the Mohs AUC if you disagree with the automatically calculated number and this option is found in the Case Summary tab.
Referred To Oculoplastics For Closure Text (Leave Blank If You Do Not Want): After obtaining clear surgical margins the patient was sent to oculoplastics for surgical repair.  The patient understands they will receive post-surgical care and follow-up from the referring physician's office.
Graft Cartilage Fenestration Text: The cartilage was fenestrated with a 2mm punch biopsy to help facilitate graft survival and healing.
Complex Repair And Flap Additional Text (Will Appearing After The Standard Complex Repair Text): The complex repair was not sufficient to completely close the primary defect. The remaining additional defect was repaired with the flap mentioned below.
Referring Physician (Optional): Dr Sal
Cheek Interpolation Flap Text: A decision was made to reconstruct the defect utilizing an interpolation axial flap and a staged reconstruction.  A telfa template was made of the defect.  This telfa template was then used to outline the Cheek Interpolation flap.  The donor area for the pedicle flap was then injected with anesthesia.  The flap was excised through the skin and subcutaneous tissue down to the layer of the underlying musculature.  The interpolation flap was carefully excised within this deep plane to maintain its blood supply.  The edges of the donor site were undermined.   The donor site was closed in a primary fashion.  The pedicle was then rotated into position and sutured.  Once the tube was sutured into place, adequate blood supply was confirmed with blanching and refill.  The pedicle was then wrapped with xeroform gauze and dressed appropriately with a telfa and gauze bandage to ensure continued blood supply and protect the attached pedicle.
Graft Donor Site Dermal Sutures (Optional): 5-0 Vicryl
Tumor Debulked?: curette
Cartilage Graft Text: The defect edges were debeveled with a #15 scalpel blade.  Given the location of the defect, shape of the defect, the fact the defect involved a full thickness cartilage defect a cartilage graft was deemed most appropriate.  An appropriate donor site was identified, cleansed, and anesthetized. The cartilage graft was then harvested and transferred to the recipient site, oriented appropriately and then sutured into place.  The secondary defect was then repaired using a primary closure.
Complex Repair And Graft Additional Text (Will Appearing After The Standard Complex Repair Text): The complex repair was not sufficient to completely close the primary defect. The remaining additional defect was repaired with the graft mentioned below.
Transposition Flap Text: The defect edges were debeveled with a #15 scalpel blade.  Given the location of the defect and the proximity to free margins a transposition flap was deemed most appropriate.  Using a sterile surgical marker, an appropriate transposition flap was drawn incorporating the defect.    The area thus outlined was incised deep to adipose tissue with a #15 scalpel blade.  The skin margins were undermined to an appropriate distance in all directions utilizing iris scissors.
Bilateral Helical Rim Advancement Flap Text: The defect edges were debeveled with a #15 blade scalpel.  Given the location of the defect and the proximity to free margins (helical rim) a bilateral helical rim advancement flap was deemed most appropriate.  Using a sterile surgical marker, the appropriate advancement flaps were drawn incorporating the defect and placing the expected incisions between the helical rim and antihelix where possible.  The area thus outlined was incised through and through with a #15 scalpel blade.  With a skin hook and iris scissors, the flaps were gently and sharply undermined and freed up.
Paramedian Forehead Flap Text: A decision was made to reconstruct the defect utilizing an interpolation axial flap and a staged reconstruction.  A telfa template was made of the defect.  This telfa template was then used to outline the paramedian forehead pedicle flap.  The donor area for the pedicle flap was then injected with anesthesia.  The flap was excised through the skin and subcutaneous tissue down to the layer of the underlying musculature.  The pedicle flap was carefully excised within this deep plane to maintain its blood supply.  The edges of the donor site were undermined.   The donor site was closed in a primary fashion.  The pedicle was then rotated into position and sutured.  Once the tube was sutured into place, adequate blood supply was confirmed with blanching and refill.  The pedicle was then wrapped with xeroform gauze and dressed appropriately with a telfa and gauze bandage to ensure continued blood supply and protect the attached pedicle.
Alar Island Pedicle Flap Text: The defect edges were debeveled with a #15 scalpel blade.  Given the location of the defect, shape of the defect and the proximity to the alar rim an island pedicle advancement flap was deemed most appropriate.  Using a sterile surgical marker, an appropriate advancement flap was drawn incorporating the defect, outlining the appropriate donor tissue and placing the expected incisions within the nasal ala running parallel to the alar rim. The area thus outlined was incised with a #15 scalpel blade.  The skin margins were undermined minimally to an appropriate distance in all directions around the primary defect and laterally outward around the island pedicle utilizing iris scissors.  There was minimal undermining beneath the pedicle flap.
Initial Size Of Lesion: 0.7
Consent (Ear)/Introductory Paragraph: The rationale for Mohs was explained to the patient and consent was obtained. The risks, benefits and alternatives to therapy were discussed in detail. Specifically, the risks of ear deformity, infection, scarring, bleeding, prolonged wound healing, incomplete removal, allergy to anesthesia, nerve injury and recurrence were addressed. Prior to the procedure, the treatment site was clearly identified and confirmed by the patient. All components of Universal Protocol/PAUSE Rule completed.
Star Wedge Flap Text: The defect edges were debeveled with a #15 scalpel blade.  Given the location of the defect, shape of the defect and the proximity to free margins a star wedge flap was deemed most appropriate.  Using a sterile surgical marker, an appropriate rotation flap was drawn incorporating the defect and placing the expected incisions within the relaxed skin tension lines where possible. The area thus outlined was incised deep to adipose tissue with a #15 scalpel blade.  The skin margins were undermined to an appropriate distance in all directions utilizing iris scissors.
Ftsg Text: The defect edges were debeveled with a #15 scalpel blade.  Given the location of the defect, shape of the defect and the proximity to free margins a full thickness skin graft was deemed most appropriate.  Using a sterile surgical marker, the primary defect shape was transferred to the donor site. The area thus outlined was incised deep to adipose tissue with a #15 scalpel blade.  The harvested graft was then trimmed of adipose tissue until only dermis and epidermis was left.  The skin margins of the secondary defect were undermined to an appropriate distance in all directions utilizing iris scissors.  The secondary defect was closed with interrupted buried subcutaneous sutures.  The skin edges were then re-apposed with running  sutures.  The skin graft was then placed in the primary defect and oriented appropriately.
Helical Rim Advancement Flap Text: The defect edges were debeveled with a #15 blade scalpel.  Given the location of the defect and the proximity to free margins (helical rim) a double helical rim advancement flap was deemed most appropriate.  Using a sterile surgical marker, the appropriate advancement flaps were drawn incorporating the defect and placing the expected incisions between the helical rim and antihelix where possible.  The area thus outlined was incised through and through with a #15 scalpel blade.  With a skin hook and iris scissors, the flaps were gently and sharply undermined and freed up.
Double O-Z Plasty Text: The defect edges were debeveled with a #15 scalpel blade.  Given the location of the defect, shape of the defect and the proximity to free margins a Double O-Z plasty (double transposition flap) was deemed most appropriate.  Using a sterile surgical marker, the appropriate transposition flaps were drawn incorporating the defect and placing the expected incisions within the relaxed skin tension lines where possible. The area thus outlined was incised deep to adipose tissue with a #15 scalpel blade.  The skin margins were undermined to an appropriate distance in all directions utilizing iris scissors.  Hemostasis was achieved with electrocautery.  The flaps were then transposed into place, one clockwise and the other counterclockwise, and anchored with interrupted buried subcutaneous sutures.
Closure 3 Information: This tab is for additional flaps and grafts above and beyond our usual structured repairs.  Please note if you enter information here it will not currently bill and you will need to add the billing information manually.
No Repair - Repaired With Adjacent Surgical Defect Text (Leave Blank If You Do Not Want): After obtaining clear surgical margins the defect was repaired concurrently with another surgical defect which was in close approximation.
Tarsorrhaphy Text: A tarsorrhaphy was performed using Frost sutures.
Donor Site Anesthesia Type: same as repair anesthesia
Repair Anesthesia Method: local infiltration
Mohs Case Number: 
Epidermal Closure: simple interrupted
Consent (Marginal Mandibular)/Introductory Paragraph: The rationale for Mohs was explained to the patient and consent was obtained. The risks, benefits and alternatives to therapy were discussed in detail. Specifically, the risks of damage to the marginal mandibular branch of the facial nerve, infection, scarring, bleeding, prolonged wound healing, incomplete removal, allergy to anesthesia, and recurrence were addressed. Prior to the procedure, the treatment site was clearly identified and confirmed by the patient. All components of Universal Protocol/PAUSE Rule completed.
Advancement Flap (Double) Text: The defect edges were debeveled with a #15 scalpel blade.  Given the location of the defect and the proximity to free margins a double advancement flap was deemed most appropriate.  Using a sterile surgical marker, the appropriate advancement flaps were drawn incorporating the defect and placing the expected incisions within the relaxed skin tension lines where possible.    The area thus outlined was incised deep to adipose tissue with a #15 scalpel blade.  The skin margins were undermined to an appropriate distance in all directions utilizing iris scissors.
Primary Defect Length In Cm (Final Defect Size - Required For Flaps/Grafts): 1.2
Inflammation Suggestive Of Cancer Camouflage Histology Text: There was a dense lymphocytic infiltrate which prevented adequate histologic evaluation of adjacent structures.
Same Histology In Subsequent Stages Text: The pattern and morphology of the tumor is as described in the first stage.
Mohs Method Verbiage: An incision at a 45 degree angle following the standard Mohs approach was done and the specimen was harvested as a microscopic controlled layer.
Consent (Scalp)/Introductory Paragraph: The rationale for Mohs was explained to the patient and consent was obtained. The risks, benefits and alternatives to therapy were discussed in detail. Specifically, the risks of changes in hair growth pattern secondary to repair, infection, scarring, bleeding, prolonged wound healing, incomplete removal, allergy to anesthesia, nerve injury and recurrence were addressed. Prior to the procedure, the treatment site was clearly identified and confirmed by the patient. All components of Universal Protocol/PAUSE Rule completed.
Lazy S Complex Repair Preamble Text (Leave Blank If You Do Not Want): Extensive wide undermining was performed.
Consent (Lip)/Introductory Paragraph: The rationale for Mohs was explained to the patient and consent was obtained. The risks, benefits and alternatives to therapy were discussed in detail. Specifically, the risks of lip deformity, changes in the oral aperture, infection, scarring, bleeding, prolonged wound healing, incomplete removal, allergy to anesthesia, nerve injury and recurrence were addressed. Prior to the procedure, the treatment site was clearly identified and confirmed by the patient. All components of Universal Protocol/PAUSE Rule completed.
Hemostasis: Electrocautery
Consent 2/Introductory Paragraph: Mohs surgery was explained to the patient and consent was obtained. The risks, benefits and alternatives to therapy were discussed in detail. Specifically, the risks of infection, scarring, bleeding, prolonged wound healing, incomplete removal, allergy to anesthesia, nerve injury and recurrence were addressed. Prior to the procedure, the treatment site was clearly identified and confirmed by the patient. All components of Universal Protocol/PAUSE Rule completed.
Bilobed Transposition Flap Text: The defect edges were debeveled with a #15 scalpel blade.  Given the location of the defect and the proximity to free margins a bilobed transposition flap was deemed most appropriate.  Using a sterile surgical marker, an appropriate bilobe flap drawn around the defect.    The area thus outlined was incised deep to adipose tissue with a #15 scalpel blade.  The skin margins were undermined to an appropriate distance in all directions utilizing iris scissors.
Information: Selecting Yes will display possible errors in your note based on the variables you have selected. This validation is only offered as a suggestion for you. PLEASE NOTE THAT THE VALIDATION TEXT WILL BE REMOVED WHEN YOU FINALIZE YOUR NOTE. IF YOU WANT TO FAX A PRELIMINARY NOTE YOU WILL NEED TO TOGGLE THIS TO 'NO' IF YOU DO NOT WANT IT IN YOUR FAXED NOTE.
Detail Level: Detailed
Mastoid Interpolation Flap Text: A decision was made to reconstruct the defect utilizing an interpolation axial flap and a staged reconstruction.  A telfa template was made of the defect.  This telfa template was then used to outline the mastoid interpolation flap.  The donor area for the pedicle flap was then injected with anesthesia.  The flap was excised through the skin and subcutaneous tissue down to the layer of the underlying musculature.  The pedicle flap was carefully excised within this deep plane to maintain its blood supply.  The edges of the donor site were undermined.   The donor site was closed in a primary fashion.  The pedicle was then rotated into position and sutured.  Once the tube was sutured into place, adequate blood supply was confirmed with blanching and refill.  The pedicle was then wrapped with xeroform gauze and dressed appropriately with a telfa and gauze bandage to ensure continued blood supply and protect the attached pedicle.
Alternatives Discussed Intro (Do Not Add Period): I discussed alternative treatments to Mohs surgery and specifically discussed the risks and benefits of
Island Pedicle Flap Text: The defect edges were debeveled with a #15 scalpel blade.  Given the location of the defect, shape of the defect and the proximity to free margins an island pedicle advancement flap was deemed most appropriate.  Using a sterile surgical marker, an appropriate advancement flap was drawn incorporating the defect, outlining the appropriate donor tissue and placing the expected incisions within the relaxed skin tension lines where possible.    The area thus outlined was incised deep to adipose tissue with a #15 scalpel blade.  The skin margins were undermined to an appropriate distance in all directions around the primary defect and laterally outward around the island pedicle utilizing iris scissors.  There was minimal undermining beneath the pedicle flap.
Mercedes Flap Text: The defect edges were debeveled with a #15 scalpel blade.  Given the location of the defect, shape of the defect and the proximity to free margins a Mercedes flap was deemed most appropriate.  Using a sterile surgical marker, an appropriate advancement flap was drawn incorporating the defect and placing the expected incisions within the relaxed skin tension lines where possible. The area thus outlined was incised deep to adipose tissue with a #15 scalpel blade.  The skin margins were undermined to an appropriate distance in all directions utilizing iris scissors.
Area M Indication Text: Tumors in this location are included in Area M (cheek, forehead, scalp, neck, jawline and pretibial skin).  Mohs surgery is indicated for tumors 1 cm or larger in these anatomic locations.
Location Indication Override (Is Already Calculated Based On Selected Body Location): Area M
O-Z Plasty Text: The defect edges were debeveled with a #15 scalpel blade.  Given the location of the defect, shape of the defect and the proximity to free margins an O-Z plasty (double transposition flap) was deemed most appropriate.  Using a sterile surgical marker, the appropriate transposition flaps were drawn incorporating the defect and placing the expected incisions within the relaxed skin tension lines where possible.    The area thus outlined was incised deep to adipose tissue with a #15 scalpel blade.  The skin margins were undermined to an appropriate distance in all directions utilizing iris scissors.  Hemostasis was achieved with electrocautery.  The flaps were then transposed into place, one clockwise and the other counterclockwise, and anchored with interrupted buried subcutaneous sutures.
Post-Care Instructions: I reviewed with the patient in detail post-care instructions. Patient is not to engage in any heavy lifting, exercise, or swimming for the next 14 days. Should the patient develop any fevers, chills, bleeding, severe pain patient will contact the office immediately.
V-Y Plasty Text: The defect edges were debeveled with a #15 scalpel blade.  Given the location of the defect, shape of the defect and the proximity to free margins an V-Y advancement flap was deemed most appropriate.  Using a sterile surgical marker, an appropriate advancement flap was drawn incorporating the defect and placing the expected incisions within the relaxed skin tension lines where possible.    The area thus outlined was incised deep to adipose tissue with a #15 scalpel blade.  The skin margins were undermined to an appropriate distance in all directions utilizing iris scissors.
Hatchet Flap Text: The defect edges were debeveled with a #15 scalpel blade.  Given the location of the defect, shape of the defect and the proximity to free margins a hatchet flap was deemed most appropriate.  Using a sterile surgical marker, an appropriate hatchet flap was drawn incorporating the defect and placing the expected incisions within the relaxed skin tension lines where possible.    The area thus outlined was incised deep to adipose tissue with a #15 scalpel blade.  The skin margins were undermined to an appropriate distance in all directions utilizing iris scissors.
V-Y Flap Text: The defect edges were debeveled with a #15 scalpel blade.  Given the location of the defect, shape of the defect and the proximity to free margins a V-Y flap was deemed most appropriate.  Using a sterile surgical marker, an appropriate advancement flap was drawn incorporating the defect and placing the expected incisions within the relaxed skin tension lines where possible.    The area thus outlined was incised deep to adipose tissue with a #15 scalpel blade.  The skin margins were undermined to an appropriate distance in all directions utilizing iris scissors.
Consent (Near Eyelid Margin)/Introductory Paragraph: The rationale for Mohs was explained to the patient and consent was obtained. The risks, benefits and alternatives to therapy were discussed in detail. Specifically, the risks of ectropion or eyelid deformity, infection, scarring, bleeding, prolonged wound healing, incomplete removal, allergy to anesthesia, nerve injury and recurrence were addressed. Prior to the procedure, the treatment site was clearly identified and confirmed by the patient. All components of Universal Protocol/PAUSE Rule completed.
Tissue Cultured Epidermal Autograft Text: The defect edges were debeveled with a #15 scalpel blade.  Given the location of the defect, shape of the defect and the proximity to free margins a tissue cultured epidermal autograft was deemed most appropriate.  The graft was then trimmed to fit the size of the defect.  The graft was then placed in the primary defect and oriented appropriately.
Consent (Spinal Accessory)/Introductory Paragraph: The rationale for Mohs was explained to the patient and consent was obtained. The risks, benefits and alternatives to therapy were discussed in detail. Specifically, the risks of damage to the spinal accessory nerve, infection, scarring, bleeding, prolonged wound healing, incomplete removal, allergy to anesthesia, and recurrence were addressed. Prior to the procedure, the treatment site was clearly identified and confirmed by the patient. All components of Universal Protocol/PAUSE Rule completed.
Mohs Histo Method Verbiage: The section(s) were then chromacoded and processed in the Mohs lab using the Mohs protocol and submitted for frozen section.
Eye Protection Verbiage: Before proceeding with the stage, a plastic scleral shield was inserted. The globe was anesthetized with a few drops of 1% lidocaine with 1:100,000 epinephrine. Then, an appropriate sized scleral shield was chosen and coated with lacrilube ointment. The shield was gently inserted and left in place for the duration of each stage. After the stage was completed, the shield was gently removed.
Spiral Flap Text: The defect edges were debeveled with a #15 scalpel blade.  Given the location of the defect, shape of the defect and the proximity to free margins a spiral flap was deemed most appropriate.  Using a sterile surgical marker, an appropriate rotation flap was drawn incorporating the defect and placing the expected incisions within the relaxed skin tension lines where possible. The area thus outlined was incised deep to adipose tissue with a #15 scalpel blade.  The skin margins were undermined to an appropriate distance in all directions utilizing iris scissors.
Consent (Temporal Branch)/Introductory Paragraph: The rationale for Mohs was explained to the patient and consent was obtained. The risks, benefits and alternatives to therapy were discussed in detail. Specifically, the risks of damage to the temporal branch of the facial nerve, infection, scarring, bleeding, prolonged wound healing, incomplete removal, allergy to anesthesia, and recurrence were addressed. Prior to the procedure, the treatment site was clearly identified and confirmed by the patient. All components of Universal Protocol/PAUSE Rule completed.
Melolabial Transposition Flap Text: The defect edges were debeveled with a #15 scalpel blade.  Given the location of the defect and the proximity to free margins a melolabial flap was deemed most appropriate.  Using a sterile surgical marker, an appropriate melolabial transposition flap was drawn incorporating the defect.    The area thus outlined was incised deep to adipose tissue with a #15 scalpel blade.  The skin margins were undermined to an appropriate distance in all directions utilizing iris scissors.
Modified Advancement Flap Text: The defect edges were debeveled with a #15 scalpel blade.  Given the location of the defect, shape of the defect and the proximity to free margins a modified advancement flap was deemed most appropriate.  Using a sterile surgical marker, an appropriate advancement flap was drawn incorporating the defect and placing the expected incisions within the relaxed skin tension lines where possible.    The area thus outlined was incised deep to adipose tissue with a #15 scalpel blade.  The skin margins were undermined to an appropriate distance in all directions utilizing iris scissors.
Island Pedicle Flap With Canthal Suspension Text: The defect edges were debeveled with a #15 scalpel blade.  Given the location of the defect, shape of the defect and the proximity to free margins an island pedicle advancement flap was deemed most appropriate.  Using a sterile surgical marker, an appropriate advancement flap was drawn incorporating the defect, outlining the appropriate donor tissue and placing the expected incisions within the relaxed skin tension lines where possible. The area thus outlined was incised deep to adipose tissue with a #15 scalpel blade.  The skin margins were undermined to an appropriate distance in all directions around the primary defect and laterally outward around the island pedicle utilizing iris scissors.  There was minimal undermining beneath the pedicle flap. A suspension suture was placed in the canthal tendon to prevent tension and prevent ectropion.
Split-Thickness Skin Graft Text: The defect edges were debeveled with a #15 scalpel blade.  Given the location of the defect, shape of the defect and the proximity to free margins a split thickness skin graft was deemed most appropriate.  Using a sterile surgical marker, the primary defect shape was transferred to the donor site. The split thickness graft was then harvested.  The skin graft was then placed in the primary defect and oriented appropriately.
H Plasty Text: Given the location of the defect, shape of the defect and the proximity to free margins a H-plasty was deemed most appropriate for repair.  Using a sterile surgical marker, the appropriate advancement arms of the H-plasty were drawn incorporating the defect and placing the expected incisions within the relaxed skin tension lines where possible. The area thus outlined was incised deep to adipose tissue with a #15 scalpel blade. The skin margins were undermined to an appropriate distance in all directions utilizing iris scissors.  The opposing advancement arms were then advanced into place in opposite direction and anchored with interrupted buried subcutaneous sutures.
Secondary Intention Text (Leave Blank If You Do Not Want): The defect will heal with secondary intention.
Graft Donor Site Epidermal Sutures (Optional): 5-0 Prolene
S Plasty Text: Given the location and shape of the defect, and the orientation of relaxed skin tension lines, an S-plasty was deemed most appropriate for repair.  Using a sterile surgical marker, the appropriate outline of the S-plasty was drawn, incorporating the defect and placing the expected incisions within the relaxed skin tension lines where possible.  The area thus outlined was incised deep to adipose tissue with a #15 scalpel blade.  The skin margins were undermined to an appropriate distance in all directions utilizing iris scissors. The skin flaps were advanced over the defect.  The opposing margins were then approximated with interrupted buried subcutaneous sutures.
Home Suture Removal Text: Patient was provided instructions on removing sutures and will remove their sutures at home.  If they have any questions or difficulties they will call the office.
Bcc Histology Text: There were numerous aggregates of basaloid cells.
Wound Care (No Sutures): Petrolatum
Epidermal Closure Graft Donor Site (Optional): running and interrupted
Posterior Auricular Interpolation Flap Text: A decision was made to reconstruct the defect utilizing an interpolation axial flap and a staged reconstruction.  A telfa template was made of the defect.  This telfa template was then used to outline the posterior auricular interpolation flap.  The donor area for the pedicle flap was then injected with anesthesia.  The flap was excised through the skin and subcutaneous tissue down to the layer of the underlying musculature.  The pedicle flap was carefully excised within this deep plane to maintain its blood supply.  The edges of the donor site were undermined.   The donor site was closed in a primary fashion.  The pedicle was then rotated into position and sutured.  Once the tube was sutured into place, adequate blood supply was confirmed with blanching and refill.  The pedicle was then wrapped with xeroform gauze and dressed appropriately with a telfa and gauze bandage to ensure continued blood supply and protect the attached pedicle.
Epidermal Autograft Text: The defect edges were debeveled with a #15 scalpel blade.  Given the location of the defect, shape of the defect and the proximity to free margins an epidermal autograft was deemed most appropriate.  Using a sterile surgical marker, the primary defect shape was transferred to the donor site. The epidermal graft was then harvested.  The skin graft was then placed in the primary defect and oriented appropriately.
Mohs Rapid Report Verbiage: The area of clinically evident tumor was marked with skin marking ink and appropriately hatched.  The initial incision was made following the Mohs approach through the skin.  The specimen was taken to the lab, divided into the necessary number of pieces, chromacoded and processed according to the Mohs protocol.  This was repeated in successive stages until a tumor free defect was achieved.
Postop Diagnosis: same
Mucosal Advancement Flap Text: Given the location of the defect, shape of the defect and the proximity to free margins a mucosal advancement flap was deemed most appropriate. Incisions were made with a 15 blade scalpel in the appropriate fashion along the cutaneous vermillion border and the mucosal lip. The remaining actinically damaged mucosal tissue was excised.  The mucosal advancement flap was then elevated to the gingival sulcus with care taken to preserve the neurovascular structures and advanced into the primary defect. Care was taken to ensure that precise realignment of the vermillion border was achieved.
Area H Indication Text: Tumors in this location are included in Area H (eyelids, eyebrows, nose, lips, chin, ear, pre-auricular, post-auricular, temple, genitalia, hands, feet, ankles and areola).  Tissue conservation is critical in these anatomic locations.
Cheek-To-Nose Interpolation Flap Text: A decision was made to reconstruct the defect utilizing an interpolation axial flap and a staged reconstruction.  A telfa template was made of the defect.  This telfa template was then used to outline the Cheek-To-Nose Interpolation flap.  The donor area for the pedicle flap was then injected with anesthesia.  The flap was excised through the skin and subcutaneous tissue down to the layer of the underlying musculature.  The interpolation flap was carefully excised within this deep plane to maintain its blood supply.  The edges of the donor site were undermined.   The donor site was closed in a primary fashion.  The pedicle was then rotated into position and sutured.  Once the tube was sutured into place, adequate blood supply was confirmed with blanching and refill.  The pedicle was then wrapped with xeroform gauze and dressed appropriately with a telfa and gauze bandage to ensure continued blood supply and protect the attached pedicle.
Dorsal Nasal Flap Text: The defect edges were debeveled with a #15 scalpel blade.  Given the location of the defect and the proximity to free margins a dorsal nasal flap was deemed most appropriate.  Using a sterile surgical marker, an appropriate dorsal nasal flap was drawn around the defect.    The area thus outlined was incised deep to adipose tissue with a #15 scalpel blade.  The skin margins were undermined to an appropriate distance in all directions utilizing iris scissors.
Purse String (Simple) Text: Given the location of the defect and the characteristics of the surrounding skin a pursestring closure was deemed most appropriate.  Undermining was performed circumfirentially around the surgical defect.  A purstring suture was then placed and tightened.
Area L Indication Text: Tumors in this location are included in Area L (trunk and extremities).  Mohs surgery is indicated for larger tumors, 2 cm or larger, in these anatomic locations.
No Residual Tumor Seen Histology Text: There were no malignant cells seen in the sections examined.
Purse String (Intermediate) Text: Given the location of the defect and the characteristics of the surrounding skin a pursestring intermediate closure was deemed most appropriate.  Undermining was performed circumfirentially around the surgical defect.  A purstring suture was then placed and tightened.
Double Island Pedicle Flap Text: The defect edges were debeveled with a #15 scalpel blade.  Given the location of the defect, shape of the defect and the proximity to free margins a double island pedicle advancement flap was deemed most appropriate.  Using a sterile surgical marker, an appropriate advancement flap was drawn incorporating the defect, outlining the appropriate donor tissue and placing the expected incisions within the relaxed skin tension lines where possible.    The area thus outlined was incised deep to adipose tissue with a #15 scalpel blade.  The skin margins were undermined to an appropriate distance in all directions around the primary defect and laterally outward around the island pedicle utilizing iris scissors.  There was minimal undermining beneath the pedicle flap.
Primary Defect Width In Cm (Final Defect Size - Required For Flaps/Grafts): 1.1
Repair Type: Intermediate Layered Repair
Medical Necessity Statement: Based on my medical judgement, Mohs surgery is the most appropriate treatment for this cancer compared to other treatments.
Surgeon/Pathologist Verbiage (Will Incorporate Name Of Surgeon From Intro If Not Blank): operated in two distinct and integrated capacities as the surgeon and pathologist.
Rhombic Flap Text: The defect edges were debeveled with a #15 scalpel blade.  Given the location of the defect and the proximity to free margins a rhombic flap was deemed most appropriate.  Using a sterile surgical marker, an appropriate for rhombic flap was drawn incorporating the defect.    The area thus outlined was incised deep to adipose tissue with a #15 scalpel blade.  The skin margins were undermined to an appropriate distance in all directions utilizing iris scissors.
Surgeon: Jefferson Cortes MD
Skin Substitute Text: The defect edges were debeveled with a #15 scalpel blade.  Given the location of the defect, shape of the defect and the proximity to free margins a skin substitute graft was deemed most appropriate.  The graft material was trimmed to fit the size of the defect. The graft was then placed in the primary defect and oriented appropriately.
Localized Dermabrasion With Wire Brush Text: The patient was draped in routine manner.  Localized dermabrasion using 3 x 17 mm wire brush was performed in routine manner to papillary dermis. This spot dermabrasion is being performed to complete skin cancer reconstruction. It also will eliminate the other sun damaged precancerous cells that are known to be part of the regional effect of a lifetime's worth of sun exposure. This localized dermabrasion is therapeutic and should not be considered cosmetic in any regard.
Muscle Hinge Flap Text: The defect edges were debeveled with a #15 scalpel blade.  Given the size, depth and location of the defect and the proximity to free margins a muscle hinge flap was deemed most appropriate.  Using a sterile surgical marker, an appropriate hinge flap was drawn incorporating the defect. The area thus outlined was incised with a #15 scalpel blade.  The skin margins were undermined to an appropriate distance in all directions utilizing iris scissors.
Surgeon Performing Repair (Optional): Dr Cortes

## 2019-02-05 ENCOUNTER — APPOINTMENT (RX ONLY)
Dept: URBAN - METROPOLITAN AREA CLINIC 23 | Facility: CLINIC | Age: 54
Setting detail: DERMATOLOGY
End: 2019-02-05

## 2019-02-05 DIAGNOSIS — Z48.01 ENCOUNTER FOR CHANGE OR REMOVAL OF SURGICAL WOUND DRESSING: ICD-10-CM

## 2019-02-05 PROCEDURE — 99024 POSTOP FOLLOW-UP VISIT: CPT

## 2019-02-05 PROCEDURE — ? SUTURE REMOVAL (GLOBAL PERIOD)

## 2019-02-05 ASSESSMENT — LOCATION SIMPLE DESCRIPTION DERM: LOCATION SIMPLE: RIGHT ZYGOMA

## 2019-02-05 ASSESSMENT — LOCATION DETAILED DESCRIPTION DERM: LOCATION DETAILED: RIGHT CENTRAL ZYGOMA

## 2019-02-05 ASSESSMENT — LOCATION ZONE DERM: LOCATION ZONE: FACE

## 2019-02-05 NOTE — PROCEDURE: SUTURE REMOVAL (GLOBAL PERIOD)
Add 88130 Cpt? (Important Note: In 2017 The Use Of 39162 Is Being Tracked By Cms To Determine Future Global Period Reimbursement For Global Periods): no
Detail Level: Detailed

## 2019-04-01 ENCOUNTER — APPOINTMENT (RX ONLY)
Dept: URBAN - METROPOLITAN AREA CLINIC 24 | Facility: CLINIC | Age: 54
Setting detail: DERMATOLOGY
End: 2019-04-01

## 2019-04-01 DIAGNOSIS — L81.4 OTHER MELANIN HYPERPIGMENTATION: ICD-10-CM

## 2019-04-01 DIAGNOSIS — Z71.89 OTHER SPECIFIED COUNSELING: ICD-10-CM

## 2019-04-01 DIAGNOSIS — D22 MELANOCYTIC NEVI: ICD-10-CM

## 2019-04-01 DIAGNOSIS — L57.0 ACTINIC KERATOSIS: ICD-10-CM

## 2019-04-01 DIAGNOSIS — D18.0 HEMANGIOMA: ICD-10-CM

## 2019-04-01 DIAGNOSIS — Z85.828 PERSONAL HISTORY OF OTHER MALIGNANT NEOPLASM OF SKIN: ICD-10-CM

## 2019-04-01 PROBLEM — D22.62 MELANOCYTIC NEVI OF LEFT UPPER LIMB, INCLUDING SHOULDER: Status: ACTIVE | Noted: 2019-04-01

## 2019-04-01 PROBLEM — D22.5 MELANOCYTIC NEVI OF TRUNK: Status: ACTIVE | Noted: 2019-04-01

## 2019-04-01 PROBLEM — D22.71 MELANOCYTIC NEVI OF RIGHT LOWER LIMB, INCLUDING HIP: Status: ACTIVE | Noted: 2019-04-01

## 2019-04-01 PROBLEM — D18.01 HEMANGIOMA OF SKIN AND SUBCUTANEOUS TISSUE: Status: ACTIVE | Noted: 2019-04-01

## 2019-04-01 PROBLEM — D22.72 MELANOCYTIC NEVI OF LEFT LOWER LIMB, INCLUDING HIP: Status: ACTIVE | Noted: 2019-04-01

## 2019-04-01 PROCEDURE — 11301 SHAVE SKIN LESION 0.6-1.0 CM: CPT

## 2019-04-01 PROCEDURE — 17000 DESTRUCT PREMALG LESION: CPT | Mod: 59

## 2019-04-01 PROCEDURE — ? COUNSELING

## 2019-04-01 PROCEDURE — ? LIQUID NITROGEN

## 2019-04-01 PROCEDURE — 17003 DESTRUCT PREMALG LES 2-14: CPT

## 2019-04-01 PROCEDURE — 99213 OFFICE O/P EST LOW 20 MIN: CPT | Mod: 25

## 2019-04-01 PROCEDURE — ? SHAVE REMOVAL

## 2019-04-01 ASSESSMENT — LOCATION SIMPLE DESCRIPTION DERM
LOCATION SIMPLE: RIGHT SHOULDER
LOCATION SIMPLE: LEFT CALF
LOCATION SIMPLE: LEFT UPPER ARM
LOCATION SIMPLE: LEFT LOWER BACK
LOCATION SIMPLE: RIGHT FOREHEAD
LOCATION SIMPLE: TRAPEZIAL NECK
LOCATION SIMPLE: RIGHT THIGH
LOCATION SIMPLE: CHEST
LOCATION SIMPLE: RIGHT UPPER BACK
LOCATION SIMPLE: RIGHT CHEEK
LOCATION SIMPLE: SCALP
LOCATION SIMPLE: LEFT CHEEK
LOCATION SIMPLE: ABDOMEN
LOCATION SIMPLE: RIGHT ZYGOMA
LOCATION SIMPLE: LEFT UPPER BACK

## 2019-04-01 ASSESSMENT — LOCATION DETAILED DESCRIPTION DERM
LOCATION DETAILED: RIGHT POSTERIOR SHOULDER
LOCATION DETAILED: LEFT MID-UPPER BACK
LOCATION DETAILED: RIGHT SUPERIOR UPPER BACK
LOCATION DETAILED: RIGHT CENTRAL ZYGOMA
LOCATION DETAILED: EPIGASTRIC SKIN
LOCATION DETAILED: LEFT PROXIMAL POSTERIOR UPPER ARM
LOCATION DETAILED: RIGHT LATERAL ABDOMEN
LOCATION DETAILED: LEFT SUPERIOR PARIETAL SCALP
LOCATION DETAILED: LEFT PROXIMAL CALF
LOCATION DETAILED: MID TRAPEZIAL NECK
LOCATION DETAILED: RIGHT ANTERIOR DISTAL THIGH
LOCATION DETAILED: RIGHT SUPERIOR CENTRAL MALAR CHEEK
LOCATION DETAILED: RIGHT SUPERIOR FOREHEAD
LOCATION DETAILED: LEFT INFERIOR MEDIAL MIDBACK
LOCATION DETAILED: LEFT SUPERIOR LATERAL MALAR CHEEK
LOCATION DETAILED: STERNAL NOTCH

## 2019-04-01 ASSESSMENT — LOCATION ZONE DERM
LOCATION ZONE: ARM
LOCATION ZONE: SCALP
LOCATION ZONE: FACE
LOCATION ZONE: LEG
LOCATION ZONE: NECK
LOCATION ZONE: TRUNK

## 2019-04-01 NOTE — PROCEDURE: SHAVE REMOVAL
Bill For Surgical Tray: no
Post-Care Instructions: I reviewed with the patient in detail post-care instructions. Patient is to keep the biopsy site dry overnight, and then apply bacitracin twice daily until healed. Patient may apply hydrogen peroxide soaks to remove any crusting.
Anesthesia Type: 1% lidocaine with epinephrine and a 1:10 solution of 8.4% sodium bicarbonate
Anesthesia Volume In Cc: 0.4
Billing Type: Third-Party Bill
X Size Of Lesion In Cm (Optional): 0
Path Notes (To The Dermatopathologist): Please check margins.
Size Of Lesion In Cm (Required): 0.8
Was A Bandage Applied: Yes
Consent was obtained from the patient. The risks and benefits to therapy were discussed in detail. Specifically, the risks of infection, scarring, bleeding, prolonged wound healing, incomplete removal, allergy to anesthesia, nerve injury and recurrence were addressed. Prior to the procedure, the treatment site was clearly identified and confirmed by the patient. All components of Universal Protocol/PAUSE Rule completed.
Medical Necessity Information: It is in your best interest to select a reason for this procedure from the list below. All of these items fulfill various CMS LCD requirements except the new and changing color options.
Biopsy Method: Dermablade
Notification Instructions: Patient will be notified of biopsy results. However, patient instructed to call the office if not contacted within 2 weeks.
Wound Care: Vaseline
Detail Level: Detailed
Hemostasis: Electrodesiccation

## 2019-04-19 ENCOUNTER — APPOINTMENT (RX ONLY)
Dept: URBAN - METROPOLITAN AREA CLINIC 24 | Facility: CLINIC | Age: 54
Setting detail: DERMATOLOGY
End: 2019-04-19

## 2019-04-19 DIAGNOSIS — L30.9 DERMATITIS, UNSPECIFIED: ICD-10-CM

## 2019-04-19 PROCEDURE — ? ORDER TESTS

## 2019-04-19 PROCEDURE — ? PRESCRIPTION

## 2019-04-19 PROCEDURE — ? COUNSELING

## 2019-04-19 PROCEDURE — 99213 OFFICE O/P EST LOW 20 MIN: CPT

## 2019-04-19 PROCEDURE — ? OTHER

## 2019-04-19 RX ORDER — SILVER SULFADIAZINE 10 MG/G
CREAM TOPICAL
Qty: 1 | Refills: 1 | Status: ERX | COMMUNITY
Start: 2019-04-19

## 2019-04-19 RX ORDER — DOXYCYCLINE HYCLATE 100 MG/1
CAPSULE, GELATIN COATED ORAL
Qty: 20 | Refills: 1 | Status: ERX | COMMUNITY
Start: 2019-04-19

## 2019-04-19 RX ADMIN — SILVER SULFADIAZINE: 10 CREAM TOPICAL at 00:00

## 2019-04-19 RX ADMIN — DOXYCYCLINE HYCLATE: 100 CAPSULE, GELATIN COATED ORAL at 00:00

## 2019-04-19 ASSESSMENT — LOCATION DETAILED DESCRIPTION DERM: LOCATION DETAILED: RIGHT BUTTOCK

## 2019-04-19 ASSESSMENT — LOCATION ZONE DERM: LOCATION ZONE: TRUNK

## 2019-04-19 ASSESSMENT — LOCATION SIMPLE DESCRIPTION DERM: LOCATION SIMPLE: RIGHT BUTTOCK

## 2019-04-19 NOTE — PROCEDURE: OTHER
Detail Level: Simple
Note Text (......Xxx Chief Complaint.): This diagnosis correlates with the
Other (Free Text): Doxy counseling performed including GI and photosensitive issues

## 2019-04-19 NOTE — HPI: SKIN LESION
What Type Of Note Output Would You Prefer (Optional)?: Standard Output
How Severe Is Your Skin Lesion?: mild
Has Your Skin Lesion Been Treated?: not been treated
Is This A New Presentation, Or A Follow-Up?: Skin Lesion
Additional History: Using neosporin on area.

## 2019-10-07 ENCOUNTER — APPOINTMENT (RX ONLY)
Dept: URBAN - METROPOLITAN AREA CLINIC 24 | Facility: CLINIC | Age: 54
Setting detail: DERMATOLOGY
End: 2019-10-07

## 2019-10-07 DIAGNOSIS — D18.0 HEMANGIOMA: ICD-10-CM

## 2019-10-07 DIAGNOSIS — L57.0 ACTINIC KERATOSIS: ICD-10-CM

## 2019-10-07 DIAGNOSIS — Z85.828 PERSONAL HISTORY OF OTHER MALIGNANT NEOPLASM OF SKIN: ICD-10-CM

## 2019-10-07 DIAGNOSIS — L81.4 OTHER MELANIN HYPERPIGMENTATION: ICD-10-CM

## 2019-10-07 DIAGNOSIS — D22 MELANOCYTIC NEVI: ICD-10-CM

## 2019-10-07 DIAGNOSIS — Z71.89 OTHER SPECIFIED COUNSELING: ICD-10-CM

## 2019-10-07 PROBLEM — D18.01 HEMANGIOMA OF SKIN AND SUBCUTANEOUS TISSUE: Status: ACTIVE | Noted: 2019-10-07

## 2019-10-07 PROBLEM — D22.71 MELANOCYTIC NEVI OF RIGHT LOWER LIMB, INCLUDING HIP: Status: ACTIVE | Noted: 2019-10-07

## 2019-10-07 PROBLEM — D22.5 MELANOCYTIC NEVI OF TRUNK: Status: ACTIVE | Noted: 2019-10-07

## 2019-10-07 PROBLEM — D22.72 MELANOCYTIC NEVI OF LEFT LOWER LIMB, INCLUDING HIP: Status: ACTIVE | Noted: 2019-10-07

## 2019-10-07 PROBLEM — D22.62 MELANOCYTIC NEVI OF LEFT UPPER LIMB, INCLUDING SHOULDER: Status: ACTIVE | Noted: 2019-10-07

## 2019-10-07 PROCEDURE — ? COUNSELING

## 2019-10-07 PROCEDURE — 17000 DESTRUCT PREMALG LESION: CPT

## 2019-10-07 PROCEDURE — 17003 DESTRUCT PREMALG LES 2-14: CPT

## 2019-10-07 PROCEDURE — ? LIQUID NITROGEN

## 2019-10-07 PROCEDURE — 99214 OFFICE O/P EST MOD 30 MIN: CPT | Mod: 25

## 2019-10-07 ASSESSMENT — LOCATION SIMPLE DESCRIPTION DERM
LOCATION SIMPLE: CHEST
LOCATION SIMPLE: RIGHT THIGH
LOCATION SIMPLE: SCALP
LOCATION SIMPLE: RIGHT CHEEK
LOCATION SIMPLE: RIGHT UPPER BACK
LOCATION SIMPLE: RIGHT ZYGOMA
LOCATION SIMPLE: LEFT UPPER BACK
LOCATION SIMPLE: RIGHT SHOULDER
LOCATION SIMPLE: LEFT UPPER ARM
LOCATION SIMPLE: LEFT CALF
LOCATION SIMPLE: TRAPEZIAL NECK
LOCATION SIMPLE: LEFT FOREHEAD
LOCATION SIMPLE: ABDOMEN

## 2019-10-07 ASSESSMENT — LOCATION DETAILED DESCRIPTION DERM
LOCATION DETAILED: RIGHT ANTERIOR DISTAL THIGH
LOCATION DETAILED: LEFT PROXIMAL POSTERIOR UPPER ARM
LOCATION DETAILED: LEFT PROXIMAL CALF
LOCATION DETAILED: MID TRAPEZIAL NECK
LOCATION DETAILED: RIGHT SUPERIOR CENTRAL MALAR CHEEK
LOCATION DETAILED: LEFT MID-UPPER BACK
LOCATION DETAILED: RIGHT LATERAL ABDOMEN
LOCATION DETAILED: RIGHT POSTERIOR SHOULDER
LOCATION DETAILED: RIGHT SUPERIOR UPPER BACK
LOCATION DETAILED: EPIGASTRIC SKIN
LOCATION DETAILED: STERNAL NOTCH
LOCATION DETAILED: RIGHT CENTRAL ZYGOMA
LOCATION DETAILED: RIGHT SUPERIOR LATERAL MALAR CHEEK
LOCATION DETAILED: LEFT SUPERIOR PARIETAL SCALP
LOCATION DETAILED: LEFT INFERIOR LATERAL FOREHEAD

## 2019-10-07 ASSESSMENT — LOCATION ZONE DERM
LOCATION ZONE: NECK
LOCATION ZONE: LEG
LOCATION ZONE: TRUNK
LOCATION ZONE: FACE
LOCATION ZONE: SCALP
LOCATION ZONE: ARM

## 2019-10-07 NOTE — PROCEDURE: LIQUID NITROGEN

## 2020-06-12 ENCOUNTER — APPOINTMENT (RX ONLY)
Dept: URBAN - METROPOLITAN AREA CLINIC 24 | Facility: CLINIC | Age: 55
Setting detail: DERMATOLOGY
End: 2020-06-12

## 2020-06-12 DIAGNOSIS — D22 MELANOCYTIC NEVI: ICD-10-CM

## 2020-06-12 DIAGNOSIS — D18.0 HEMANGIOMA: ICD-10-CM

## 2020-06-12 DIAGNOSIS — L82.1 OTHER SEBORRHEIC KERATOSIS: ICD-10-CM

## 2020-06-12 DIAGNOSIS — L81.4 OTHER MELANIN HYPERPIGMENTATION: ICD-10-CM

## 2020-06-12 DIAGNOSIS — Z71.89 OTHER SPECIFIED COUNSELING: ICD-10-CM

## 2020-06-12 DIAGNOSIS — Z85.828 PERSONAL HISTORY OF OTHER MALIGNANT NEOPLASM OF SKIN: ICD-10-CM

## 2020-06-12 DIAGNOSIS — L57.0 ACTINIC KERATOSIS: ICD-10-CM

## 2020-06-12 PROBLEM — D22.72 MELANOCYTIC NEVI OF LEFT LOWER LIMB, INCLUDING HIP: Status: ACTIVE | Noted: 2020-06-12

## 2020-06-12 PROBLEM — D22.62 MELANOCYTIC NEVI OF LEFT UPPER LIMB, INCLUDING SHOULDER: Status: ACTIVE | Noted: 2020-06-12

## 2020-06-12 PROBLEM — D18.01 HEMANGIOMA OF SKIN AND SUBCUTANEOUS TISSUE: Status: ACTIVE | Noted: 2020-06-12

## 2020-06-12 PROBLEM — D22.5 MELANOCYTIC NEVI OF TRUNK: Status: ACTIVE | Noted: 2020-06-12

## 2020-06-12 PROBLEM — D22.71 MELANOCYTIC NEVI OF RIGHT LOWER LIMB, INCLUDING HIP: Status: ACTIVE | Noted: 2020-06-12

## 2020-06-12 PROCEDURE — ? LIQUID NITROGEN

## 2020-06-12 PROCEDURE — 99213 OFFICE O/P EST LOW 20 MIN: CPT | Mod: 25

## 2020-06-12 PROCEDURE — ? COUNSELING

## 2020-06-12 PROCEDURE — 17000 DESTRUCT PREMALG LESION: CPT

## 2020-06-12 PROCEDURE — ? LIQUID NITROGEN (COSMETIC)

## 2020-06-12 PROCEDURE — 17003 DESTRUCT PREMALG LES 2-14: CPT

## 2020-06-12 ASSESSMENT — LOCATION SIMPLE DESCRIPTION DERM
LOCATION SIMPLE: RIGHT THIGH
LOCATION SIMPLE: RIGHT ZYGOMA
LOCATION SIMPLE: RIGHT UPPER BACK
LOCATION SIMPLE: LEFT TEMPLE
LOCATION SIMPLE: LEFT CALF
LOCATION SIMPLE: TRAPEZIAL NECK
LOCATION SIMPLE: LEFT UPPER BACK
LOCATION SIMPLE: ABDOMEN
LOCATION SIMPLE: RIGHT FOREHEAD
LOCATION SIMPLE: RIGHT SHOULDER
LOCATION SIMPLE: LEFT UPPER ARM
LOCATION SIMPLE: CHEST

## 2020-06-12 ASSESSMENT — LOCATION DETAILED DESCRIPTION DERM
LOCATION DETAILED: RIGHT LATERAL ABDOMEN
LOCATION DETAILED: RIGHT ANTERIOR DISTAL THIGH
LOCATION DETAILED: RIGHT INFERIOR LATERAL FOREHEAD
LOCATION DETAILED: LEFT PROXIMAL POSTERIOR UPPER ARM
LOCATION DETAILED: RIGHT CENTRAL ZYGOMA
LOCATION DETAILED: STERNAL NOTCH
LOCATION DETAILED: MID TRAPEZIAL NECK
LOCATION DETAILED: RIGHT SUPERIOR UPPER BACK
LOCATION DETAILED: RIGHT POSTERIOR SHOULDER
LOCATION DETAILED: LEFT CENTRAL TEMPLE
LOCATION DETAILED: RIGHT SUPERIOR FOREHEAD
LOCATION DETAILED: RIGHT FOREHEAD
LOCATION DETAILED: EPIGASTRIC SKIN
LOCATION DETAILED: LEFT PROXIMAL CALF
LOCATION DETAILED: LEFT MID-UPPER BACK

## 2020-06-12 ASSESSMENT — LOCATION ZONE DERM
LOCATION ZONE: FACE
LOCATION ZONE: LEG
LOCATION ZONE: TRUNK
LOCATION ZONE: NECK
LOCATION ZONE: ARM

## 2020-06-12 NOTE — PROCEDURE: LIQUID NITROGEN (COSMETIC)
Post-Care Instructions: I reviewed with the patient in detail post-care instructions. Patient is to wear sunprotection, and avoid picking at any of the treated lesions. Pt may apply Vaseline to crusted or scabbing areas.
Price (Use Numbers Only, No Special Characters Or $): 25
Detail Level: Detailed
Consent: The patient's consent was obtained including but not limited to risks of crusting, scabbing, blistering, scarring, darker or lighter pigmentary change, recurrence, incomplete removal and infection. The patient understands that the procedure is cosmetic in nature and is not covered by insurance.
Render Post-Care Instructions In Note?: no

## 2020-06-12 NOTE — PROCEDURE: LIQUID NITROGEN
Post-Care Instructions: I reviewed with the patient in detail post-care instructions. Patient is to wear sunprotection, and avoid picking at any of the treated lesions. Pt may apply Vaseline to crusted or scabbing areas.
Consent: The patient's consent was obtained including but not limited to risks of crusting, scabbing, blistering, scarring, darker or lighter pigmentary change, recurrence, incomplete removal and infection.
Number Of Freeze-Thaw Cycles: 1 freeze-thaw cycle
Render Note In Bullet Format When Appropriate: No
Detail Level: Simple
Duration Of Freeze Thaw-Cycle (Seconds): 4

## 2020-12-18 ENCOUNTER — APPOINTMENT (RX ONLY)
Dept: URBAN - METROPOLITAN AREA CLINIC 24 | Facility: CLINIC | Age: 55
Setting detail: DERMATOLOGY
End: 2020-12-18

## 2020-12-18 DIAGNOSIS — Z87.2 PERSONAL HISTORY OF DISEASES OF THE SKIN AND SUBCUTANEOUS TISSUE: ICD-10-CM

## 2020-12-18 DIAGNOSIS — L81.4 OTHER MELANIN HYPERPIGMENTATION: ICD-10-CM

## 2020-12-18 DIAGNOSIS — Z85.828 PERSONAL HISTORY OF OTHER MALIGNANT NEOPLASM OF SKIN: ICD-10-CM

## 2020-12-18 DIAGNOSIS — D22 MELANOCYTIC NEVI: ICD-10-CM

## 2020-12-18 DIAGNOSIS — Z71.89 OTHER SPECIFIED COUNSELING: ICD-10-CM

## 2020-12-18 DIAGNOSIS — D18.0 HEMANGIOMA: ICD-10-CM

## 2020-12-18 PROBLEM — D18.01 HEMANGIOMA OF SKIN AND SUBCUTANEOUS TISSUE: Status: ACTIVE | Noted: 2020-12-18

## 2020-12-18 PROBLEM — D22.72 MELANOCYTIC NEVI OF LEFT LOWER LIMB, INCLUDING HIP: Status: ACTIVE | Noted: 2020-12-18

## 2020-12-18 PROBLEM — D22.62 MELANOCYTIC NEVI OF LEFT UPPER LIMB, INCLUDING SHOULDER: Status: ACTIVE | Noted: 2020-12-18

## 2020-12-18 PROBLEM — D22.71 MELANOCYTIC NEVI OF RIGHT LOWER LIMB, INCLUDING HIP: Status: ACTIVE | Noted: 2020-12-18

## 2020-12-18 PROBLEM — D22.5 MELANOCYTIC NEVI OF TRUNK: Status: ACTIVE | Noted: 2020-12-18

## 2020-12-18 PROCEDURE — 99214 OFFICE O/P EST MOD 30 MIN: CPT

## 2020-12-18 PROCEDURE — ? COUNSELING

## 2020-12-18 ASSESSMENT — LOCATION SIMPLE DESCRIPTION DERM
LOCATION SIMPLE: LEFT CALF
LOCATION SIMPLE: RIGHT POSTERIOR THIGH
LOCATION SIMPLE: LEFT LOWER BACK
LOCATION SIMPLE: RIGHT THIGH
LOCATION SIMPLE: RIGHT ZYGOMA
LOCATION SIMPLE: LEFT UPPER ARM
LOCATION SIMPLE: RIGHT SHOULDER
LOCATION SIMPLE: LEFT UPPER BACK
LOCATION SIMPLE: CHEST
LOCATION SIMPLE: RIGHT UPPER BACK
LOCATION SIMPLE: TRAPEZIAL NECK
LOCATION SIMPLE: ABDOMEN

## 2020-12-18 ASSESSMENT — LOCATION DETAILED DESCRIPTION DERM
LOCATION DETAILED: RIGHT SUPERIOR UPPER BACK
LOCATION DETAILED: MID TRAPEZIAL NECK
LOCATION DETAILED: RIGHT POSTERIOR SHOULDER
LOCATION DETAILED: STERNAL NOTCH
LOCATION DETAILED: RIGHT ANTERIOR DISTAL THIGH
LOCATION DETAILED: LEFT PROXIMAL CALF
LOCATION DETAILED: RIGHT CENTRAL ZYGOMA
LOCATION DETAILED: RIGHT LATERAL ABDOMEN
LOCATION DETAILED: LEFT PROXIMAL POSTERIOR UPPER ARM
LOCATION DETAILED: LEFT INFERIOR MEDIAL MIDBACK
LOCATION DETAILED: LEFT MID-UPPER BACK
LOCATION DETAILED: EPIGASTRIC SKIN
LOCATION DETAILED: RIGHT DISTAL POSTERIOR THIGH

## 2020-12-18 ASSESSMENT — LOCATION ZONE DERM
LOCATION ZONE: FACE
LOCATION ZONE: TRUNK
LOCATION ZONE: ARM
LOCATION ZONE: NECK
LOCATION ZONE: LEG

## 2021-12-10 ENCOUNTER — APPOINTMENT (RX ONLY)
Dept: URBAN - METROPOLITAN AREA CLINIC 24 | Facility: CLINIC | Age: 56
Setting detail: DERMATOLOGY
End: 2021-12-10

## 2021-12-10 DIAGNOSIS — D22 MELANOCYTIC NEVI: ICD-10-CM

## 2021-12-10 DIAGNOSIS — L57.8 OTHER SKIN CHANGES DUE TO CHRONIC EXPOSURE TO NONIONIZING RADIATION: ICD-10-CM

## 2021-12-10 DIAGNOSIS — L71.8 OTHER ROSACEA: ICD-10-CM

## 2021-12-10 DIAGNOSIS — D18.0 HEMANGIOMA: ICD-10-CM

## 2021-12-10 DIAGNOSIS — Z87.2 PERSONAL HISTORY OF DISEASES OF THE SKIN AND SUBCUTANEOUS TISSUE: ICD-10-CM

## 2021-12-10 DIAGNOSIS — Z85.828 PERSONAL HISTORY OF OTHER MALIGNANT NEOPLASM OF SKIN: ICD-10-CM

## 2021-12-10 DIAGNOSIS — L57.0 ACTINIC KERATOSIS: ICD-10-CM

## 2021-12-10 DIAGNOSIS — Z71.89 OTHER SPECIFIED COUNSELING: ICD-10-CM

## 2021-12-10 DIAGNOSIS — L82.1 OTHER SEBORRHEIC KERATOSIS: ICD-10-CM

## 2021-12-10 PROBLEM — D22.71 MELANOCYTIC NEVI OF RIGHT LOWER LIMB, INCLUDING HIP: Status: ACTIVE | Noted: 2021-12-10

## 2021-12-10 PROBLEM — D22.5 MELANOCYTIC NEVI OF TRUNK: Status: ACTIVE | Noted: 2021-12-10

## 2021-12-10 PROBLEM — D18.01 HEMANGIOMA OF SKIN AND SUBCUTANEOUS TISSUE: Status: ACTIVE | Noted: 2021-12-10

## 2021-12-10 PROBLEM — D22.72 MELANOCYTIC NEVI OF LEFT LOWER LIMB, INCLUDING HIP: Status: ACTIVE | Noted: 2021-12-10

## 2021-12-10 PROBLEM — D22.62 MELANOCYTIC NEVI OF LEFT UPPER LIMB, INCLUDING SHOULDER: Status: ACTIVE | Noted: 2021-12-10

## 2021-12-10 PROCEDURE — ? LIQUID NITROGEN

## 2021-12-10 PROCEDURE — ? PRESCRIPTION

## 2021-12-10 PROCEDURE — ? OTHER

## 2021-12-10 PROCEDURE — 99214 OFFICE O/P EST MOD 30 MIN: CPT | Mod: 25

## 2021-12-10 PROCEDURE — ? COUNSELING

## 2021-12-10 PROCEDURE — 17003 DESTRUCT PREMALG LES 2-14: CPT

## 2021-12-10 PROCEDURE — 17000 DESTRUCT PREMALG LESION: CPT

## 2021-12-10 RX ORDER — METRONIDAZOLE 7.5 MG/G
CREAM TOPICAL
Qty: 45 | Refills: 11 | Status: ERX | COMMUNITY
Start: 2021-12-10

## 2021-12-10 RX ADMIN — METRONIDAZOLE: 7.5 CREAM TOPICAL at 00:00

## 2021-12-10 ASSESSMENT — LOCATION ZONE DERM
LOCATION ZONE: ARM
LOCATION ZONE: FACE
LOCATION ZONE: LEG
LOCATION ZONE: NECK
LOCATION ZONE: EAR
LOCATION ZONE: TRUNK
LOCATION ZONE: SCALP

## 2021-12-10 ASSESSMENT — LOCATION DETAILED DESCRIPTION DERM
LOCATION DETAILED: RIGHT SUPERIOR UPPER BACK
LOCATION DETAILED: RIGHT ANTERIOR DISTAL THIGH
LOCATION DETAILED: RIGHT MEDIAL SUPERIOR CHEST
LOCATION DETAILED: LEFT INFERIOR MEDIAL MIDBACK
LOCATION DETAILED: RIGHT INFERIOR LATERAL MALAR CHEEK
LOCATION DETAILED: LEFT INFERIOR CENTRAL MALAR CHEEK
LOCATION DETAILED: RIGHT FOREHEAD
LOCATION DETAILED: RIGHT POSTERIOR SHOULDER
LOCATION DETAILED: MID POSTERIOR NECK
LOCATION DETAILED: RIGHT CENTRAL FRONTAL SCALP
LOCATION DETAILED: EPIGASTRIC SKIN
LOCATION DETAILED: RIGHT DISTAL POSTERIOR THIGH
LOCATION DETAILED: LEFT PROXIMAL CALF
LOCATION DETAILED: LEFT SUPERIOR HELIX
LOCATION DETAILED: RIGHT SUPERIOR FOREHEAD
LOCATION DETAILED: RIGHT MEDIAL FRONTAL SCALP
LOCATION DETAILED: RIGHT INFERIOR UPPER BACK
LOCATION DETAILED: RIGHT CENTRAL ZYGOMA
LOCATION DETAILED: LEFT PROXIMAL POSTERIOR UPPER ARM

## 2021-12-10 ASSESSMENT — LOCATION SIMPLE DESCRIPTION DERM
LOCATION SIMPLE: RIGHT THIGH
LOCATION SIMPLE: LEFT UPPER ARM
LOCATION SIMPLE: RIGHT SCALP
LOCATION SIMPLE: ABDOMEN
LOCATION SIMPLE: RIGHT SHOULDER
LOCATION SIMPLE: LEFT EAR
LOCATION SIMPLE: RIGHT FOREHEAD
LOCATION SIMPLE: CHEST
LOCATION SIMPLE: LEFT CHEEK
LOCATION SIMPLE: RIGHT ZYGOMA
LOCATION SIMPLE: POSTERIOR NECK
LOCATION SIMPLE: RIGHT POSTERIOR THIGH
LOCATION SIMPLE: LEFT LOWER BACK
LOCATION SIMPLE: RIGHT CHEEK
LOCATION SIMPLE: RIGHT UPPER BACK
LOCATION SIMPLE: LEFT CALF

## 2021-12-10 NOTE — PROCEDURE: LIQUID NITROGEN
Detail Level: Simple
Duration Of Freeze Thaw-Cycle (Seconds): 5
Consent: The patient's consent was obtained including but not limited to risks of crusting, scabbing, blistering, scarring, darker or lighter pigmentary change, recurrence, incomplete removal and infection.
Post-Care Instructions: I reviewed with the patient in detail post-care instructions. Patient is to wear sunprotection, and avoid picking at any of the treated lesions. Pt may apply Vaseline to crusted or scabbing areas.
Number Of Freeze-Thaw Cycles: 1 freeze-thaw cycle
Show Aperture Variable?: Yes
Render Post-Care Instructions In Note?: no

## 2021-12-10 NOTE — PROCEDURE: COUNSELING
Sunscreen Recommendations: 50 SPF+, sun protective clothing
Detail Level: Generalized
Detail Level: Detailed
Detail Level: Zone
Detail Level: Simple

## 2021-12-10 NOTE — PROCEDURE: OTHER
Other (Free Text): Discussed seeing Ritika
Render Risk Assessment In Note?: no
Note Text (......Xxx Chief Complaint.): This diagnosis correlates with the
Detail Level: Simple

## 2022-12-09 ENCOUNTER — APPOINTMENT (RX ONLY)
Dept: URBAN - METROPOLITAN AREA CLINIC 24 | Facility: CLINIC | Age: 57
Setting detail: DERMATOLOGY
End: 2022-12-09

## 2022-12-09 DIAGNOSIS — D18.0 HEMANGIOMA: ICD-10-CM

## 2022-12-09 DIAGNOSIS — Z85.828 PERSONAL HISTORY OF OTHER MALIGNANT NEOPLASM OF SKIN: ICD-10-CM

## 2022-12-09 DIAGNOSIS — Z71.89 OTHER SPECIFIED COUNSELING: ICD-10-CM

## 2022-12-09 DIAGNOSIS — L57.8 OTHER SKIN CHANGES DUE TO CHRONIC EXPOSURE TO NONIONIZING RADIATION: ICD-10-CM

## 2022-12-09 DIAGNOSIS — L82.1 OTHER SEBORRHEIC KERATOSIS: ICD-10-CM

## 2022-12-09 DIAGNOSIS — Z87.2 PERSONAL HISTORY OF DISEASES OF THE SKIN AND SUBCUTANEOUS TISSUE: ICD-10-CM

## 2022-12-09 DIAGNOSIS — D22 MELANOCYTIC NEVI: ICD-10-CM

## 2022-12-09 DIAGNOSIS — L57.0 ACTINIC KERATOSIS: ICD-10-CM

## 2022-12-09 DIAGNOSIS — L71.8 OTHER ROSACEA: ICD-10-CM | Status: STABLE

## 2022-12-09 PROBLEM — D22.71 MELANOCYTIC NEVI OF RIGHT LOWER LIMB, INCLUDING HIP: Status: ACTIVE | Noted: 2022-12-09

## 2022-12-09 PROBLEM — D18.01 HEMANGIOMA OF SKIN AND SUBCUTANEOUS TISSUE: Status: ACTIVE | Noted: 2022-12-09

## 2022-12-09 PROBLEM — D22.5 MELANOCYTIC NEVI OF TRUNK: Status: ACTIVE | Noted: 2022-12-09

## 2022-12-09 PROBLEM — D22.72 MELANOCYTIC NEVI OF LEFT LOWER LIMB, INCLUDING HIP: Status: ACTIVE | Noted: 2022-12-09

## 2022-12-09 PROBLEM — D22.62 MELANOCYTIC NEVI OF LEFT UPPER LIMB, INCLUDING SHOULDER: Status: ACTIVE | Noted: 2022-12-09

## 2022-12-09 PROCEDURE — ? COUNSELING

## 2022-12-09 PROCEDURE — 99214 OFFICE O/P EST MOD 30 MIN: CPT | Mod: 25

## 2022-12-09 PROCEDURE — 17000 DESTRUCT PREMALG LESION: CPT

## 2022-12-09 PROCEDURE — ? OTHER

## 2022-12-09 PROCEDURE — ? PRESCRIPTION

## 2022-12-09 PROCEDURE — ? LIQUID NITROGEN

## 2022-12-09 RX ORDER — METRONIDAZOLE 7.5 MG/G
CREAM TOPICAL
Qty: 45 | Refills: 11 | Status: ERX | COMMUNITY
Start: 2022-12-09

## 2022-12-09 RX ADMIN — METRONIDAZOLE: 7.5 CREAM TOPICAL at 00:00

## 2022-12-09 ASSESSMENT — LOCATION DETAILED DESCRIPTION DERM
LOCATION DETAILED: RIGHT DISTAL POSTERIOR THIGH
LOCATION DETAILED: MID POSTERIOR NECK
LOCATION DETAILED: RIGHT SUPERIOR UPPER BACK
LOCATION DETAILED: LEFT PROXIMAL POSTERIOR UPPER ARM
LOCATION DETAILED: LEFT INFERIOR MEDIAL MIDBACK
LOCATION DETAILED: RIGHT CENTRAL ZYGOMA
LOCATION DETAILED: RIGHT MEDIAL FRONTAL SCALP
LOCATION DETAILED: RIGHT MEDIAL SUPERIOR CHEST
LOCATION DETAILED: EPIGASTRIC SKIN
LOCATION DETAILED: RIGHT CENTRAL FRONTAL SCALP
LOCATION DETAILED: RIGHT POSTERIOR SHOULDER
LOCATION DETAILED: RIGHT SUPERIOR FOREHEAD
LOCATION DETAILED: LEFT INFERIOR CENTRAL MALAR CHEEK
LOCATION DETAILED: LEFT MEDIAL FRONTAL SCALP
LOCATION DETAILED: LEFT PROXIMAL CALF
LOCATION DETAILED: RIGHT ANTERIOR DISTAL THIGH
LOCATION DETAILED: RIGHT INFERIOR LATERAL MALAR CHEEK
LOCATION DETAILED: RIGHT INFERIOR UPPER BACK

## 2022-12-09 ASSESSMENT — LOCATION SIMPLE DESCRIPTION DERM
LOCATION SIMPLE: RIGHT ZYGOMA
LOCATION SIMPLE: LEFT SCALP
LOCATION SIMPLE: RIGHT CHEEK
LOCATION SIMPLE: LEFT CHEEK
LOCATION SIMPLE: RIGHT SHOULDER
LOCATION SIMPLE: ABDOMEN
LOCATION SIMPLE: RIGHT POSTERIOR THIGH
LOCATION SIMPLE: LEFT LOWER BACK
LOCATION SIMPLE: RIGHT THIGH
LOCATION SIMPLE: RIGHT UPPER BACK
LOCATION SIMPLE: LEFT CALF
LOCATION SIMPLE: RIGHT FOREHEAD
LOCATION SIMPLE: POSTERIOR NECK
LOCATION SIMPLE: LEFT UPPER ARM
LOCATION SIMPLE: RIGHT SCALP
LOCATION SIMPLE: CHEST

## 2022-12-09 ASSESSMENT — LOCATION ZONE DERM
LOCATION ZONE: SCALP
LOCATION ZONE: NECK
LOCATION ZONE: ARM
LOCATION ZONE: FACE
LOCATION ZONE: TRUNK
LOCATION ZONE: LEG

## 2022-12-09 NOTE — HPI: FULL BODY SKIN EXAMINATION
What Is The Reason For Today's Visit?: Full Body Skin Examination
What Is The Reason For Today's Visit? (Being Monitored For X): the development of new lesions
No

## 2022-12-09 NOTE — PROCEDURE: LIQUID NITROGEN
Detail Level: Detailed
Show Aperture Variable?: Yes
Consent: The patient's consent was obtained including but not limited to risks of crusting, scabbing, blistering, scarring, darker or lighter pigmentary change, recurrence, incomplete removal and infection.
Number Of Freeze-Thaw Cycles: 2 freeze-thaw cycles
Render Post-Care Instructions In Note?: no
Post-Care Instructions: I reviewed with the patient in detail post-care instructions. Patient is to wear sunprotection, and avoid picking at any of the treated lesions. Pt may apply Vaseline to crusted or scabbing areas.
Duration Of Freeze Thaw-Cycle (Seconds): 4

## 2023-12-13 ENCOUNTER — APPOINTMENT (RX ONLY)
Dept: URBAN - METROPOLITAN AREA CLINIC 24 | Facility: CLINIC | Age: 58
Setting detail: DERMATOLOGY
End: 2023-12-13

## 2023-12-13 DIAGNOSIS — Z87.2 PERSONAL HISTORY OF DISEASES OF THE SKIN AND SUBCUTANEOUS TISSUE: ICD-10-CM

## 2023-12-13 DIAGNOSIS — L57.8 OTHER SKIN CHANGES DUE TO CHRONIC EXPOSURE TO NONIONIZING RADIATION: ICD-10-CM

## 2023-12-13 DIAGNOSIS — L71.8 OTHER ROSACEA: ICD-10-CM

## 2023-12-13 DIAGNOSIS — L57.0 ACTINIC KERATOSIS: ICD-10-CM

## 2023-12-13 DIAGNOSIS — Z71.89 OTHER SPECIFIED COUNSELING: ICD-10-CM

## 2023-12-13 DIAGNOSIS — Z85.828 PERSONAL HISTORY OF OTHER MALIGNANT NEOPLASM OF SKIN: ICD-10-CM

## 2023-12-13 PROCEDURE — 99214 OFFICE O/P EST MOD 30 MIN: CPT | Mod: 25

## 2023-12-13 PROCEDURE — ? PRESCRIPTION MEDICATION MANAGEMENT

## 2023-12-13 PROCEDURE — ? COUNSELING

## 2023-12-13 PROCEDURE — ? PRESCRIPTION

## 2023-12-13 PROCEDURE — 17000 DESTRUCT PREMALG LESION: CPT

## 2023-12-13 PROCEDURE — ? LIQUID NITROGEN

## 2023-12-13 PROCEDURE — 17003 DESTRUCT PREMALG LES 2-14: CPT

## 2023-12-13 RX ORDER — DOXYCYCLINE HYCLATE 100 MG/1
CAPSULE, GELATIN COATED ORAL
Qty: 60 | Refills: 1 | Status: ERX | COMMUNITY
Start: 2023-12-13

## 2023-12-13 RX ORDER — PHARMACY COMPOUNDING ACCESSORY
EACH MISCELLANEOUS
Qty: 1 | Refills: 10 | Status: ERX | COMMUNITY
Start: 2023-12-13

## 2023-12-13 RX ADMIN — DOXYCYCLINE HYCLATE: 100 CAPSULE, GELATIN COATED ORAL at 00:00

## 2023-12-13 RX ADMIN — Medication: at 00:00

## 2023-12-13 ASSESSMENT — LOCATION SIMPLE DESCRIPTION DERM
LOCATION SIMPLE: RIGHT SHOULDER
LOCATION SIMPLE: POSTERIOR SCALP
LOCATION SIMPLE: SCALP
LOCATION SIMPLE: POSTERIOR NECK
LOCATION SIMPLE: LEFT CHEEK
LOCATION SIMPLE: RIGHT ZYGOMA
LOCATION SIMPLE: LEFT ZYGOMA
LOCATION SIMPLE: RIGHT CHEEK
LOCATION SIMPLE: LEFT LOWER BACK

## 2023-12-13 ASSESSMENT — LOCATION DETAILED DESCRIPTION DERM
LOCATION DETAILED: LEFT INFERIOR MEDIAL MIDBACK
LOCATION DETAILED: RIGHT INFERIOR LATERAL MALAR CHEEK
LOCATION DETAILED: POSTERIOR MID-PARIETAL SCALP
LOCATION DETAILED: MID POSTERIOR NECK
LOCATION DETAILED: RIGHT POSTERIOR SHOULDER
LOCATION DETAILED: LEFT CENTRAL ZYGOMA
LOCATION DETAILED: LEFT SUPERIOR PARIETAL SCALP
LOCATION DETAILED: LEFT INFERIOR CENTRAL MALAR CHEEK
LOCATION DETAILED: RIGHT CENTRAL ZYGOMA

## 2023-12-13 ASSESSMENT — LOCATION ZONE DERM
LOCATION ZONE: ARM
LOCATION ZONE: TRUNK
LOCATION ZONE: SCALP
LOCATION ZONE: NECK
LOCATION ZONE: FACE

## 2023-12-13 NOTE — PROCEDURE: LIQUID NITROGEN
Number Of Freeze-Thaw Cycles: 1 freeze-thaw cycle
Render Note In Bullet Format When Appropriate: No
Duration Of Freeze Thaw-Cycle (Seconds): 4
Show Aperture Variable?: Yes
Post-Care Instructions: I reviewed with the patient in detail post-care instructions. Patient is to wear sunprotection, and avoid picking at any of the treated lesions. Pt may apply Vaseline to crusted or scabbing areas.
Detail Level: Detailed
Consent: The patient's consent was obtained including but not limited to risks of crusting, scabbing, blistering, scarring, darker or lighter pigmentary change, recurrence, incomplete removal and infection.

## 2023-12-13 NOTE — PROCEDURE: PRESCRIPTION MEDICATION MANAGEMENT
Initiate Treatment: Triple rosacea cream\\nDoxycycline 100mg BID x 1 month
Detail Level: Zone
Render In Strict Bullet Format?: No

## 2024-05-13 ENCOUNTER — TELEPHONE (OUTPATIENT)
Dept: ORTHOPEDIC SURGERY | Age: 59
End: 2024-05-13

## 2024-05-13 DIAGNOSIS — M48.061 SPINAL STENOSIS, LUMBAR REGION WITHOUT NEUROGENIC CLAUDICATION: Primary | ICD-10-CM

## 2024-05-13 NOTE — TELEPHONE ENCOUNTER
Call was returned to patient in regards to scheduling for a injection, and a VM was left for patient.

## 2024-05-13 NOTE — TELEPHONE ENCOUNTER
Please call patient,  his Mom called and was trying to get him in and get an appt? See Ref, has she had time to review so we can schedule

## 2024-05-13 NOTE — TELEPHONE ENCOUNTER
Call returned to patient and he is being referred to Dr. Lal from  for a Right L5 snrb. Appointment for patient has been scheduled today.

## 2024-05-15 ENCOUNTER — OFFICE VISIT (OUTPATIENT)
Dept: ORTHOPEDIC SURGERY | Age: 59
End: 2024-05-15
Payer: COMMERCIAL

## 2024-05-15 DIAGNOSIS — M54.16 LUMBAR RADICULOPATHY: ICD-10-CM

## 2024-05-15 DIAGNOSIS — M48.061 SPINAL STENOSIS, LUMBAR REGION WITHOUT NEUROGENIC CLAUDICATION: Primary | ICD-10-CM

## 2024-05-15 PROCEDURE — 64483 NJX AA&/STRD TFRM EPI L/S 1: CPT | Performed by: PHYSICAL MEDICINE & REHABILITATION

## 2024-05-15 RX ORDER — TRIAMCINOLONE ACETONIDE 40 MG/ML
40 INJECTION, SUSPENSION INTRA-ARTICULAR; INTRAMUSCULAR ONCE
Status: COMPLETED | OUTPATIENT
Start: 2024-05-15 | End: 2024-05-15

## 2024-05-15 RX ADMIN — TRIAMCINOLONE ACETONIDE 40 MG: 40 INJECTION, SUSPENSION INTRA-ARTICULAR; INTRAMUSCULAR at 10:09

## 2024-05-15 NOTE — PROGRESS NOTES
Name: Carlos Ruiz  YOB: 1965  Gender: male  MRN: [id number]        Transforaminal [facility] Procedure Note    Procedure: Right  L5-S1 transforaminal epidural steroid injections     Precautions: Carlos Ruiz denies prior sensitivity to steroid, local anesthetic, iodine, or shellfish.       Consent:  Consent was obtained prior to the procedure. The procedure was discussed at length with Carlos Ruiz. He was given the opportunity to ask questions regarding the procedure and its associated risks.  In addition to the potential risks associated with the procedure itself, the patient was informed both verbally and in writing of potential side effects of the use glucocorticoids.  The patient appeared to comprehend the informed consent and desired to have the procedure performed, and informed consent was signed.     He was placed in a prone position on the fluoroscopy table and the skin was prepped and draped in a routine sterile fashion. The areas to be injected were each anesthetized with 1 ml of 1% Lidocaine. A 22 gauge 3.5 inch spinal needle was carefully advanced under fluoroscopic guidance to the right L5-S1 transforaminal space  0.5 ml of 70% of Omnipaque was injected to confirm proper needle placement and absence of subdural or vascular flow Once proper placement was confirmed, 0.5ml of 0.25 marcaine and 40 mg kenalog were injected through the spinal needle.     Fluoroscopic guidance was used intermittently over a 10-minute period to insure proper needle placement and his safety. A hard copy of the fluoroscopic image has been placed in his chart and is saved on the C-arm hard drive. He was monitored for 30 minutes after the procedure and discharged home in a stable fashion with a routine follow up.    Procedural Diagnosis:     ICD-10-CM    1. Spinal stenosis, lumbar region without neurogenic claudication  M48.061 FL NERVE BLOCK LUMBOSACRAL 1ST     triamcinolone acetonide ([id number]-40)

## 2024-07-11 ENCOUNTER — TELEPHONE (OUTPATIENT)
Dept: ORTHOPEDIC SURGERY | Age: 59
End: 2024-07-11

## 2024-07-11 DIAGNOSIS — M48.061 SPINAL STENOSIS, LUMBAR REGION WITHOUT NEUROGENIC CLAUDICATION: Primary | ICD-10-CM

## 2024-07-11 DIAGNOSIS — M54.16 LUMBAR RADICULOPATHY: ICD-10-CM

## 2024-07-16 ENCOUNTER — OFFICE VISIT (OUTPATIENT)
Dept: ORTHOPEDIC SURGERY | Age: 59
End: 2024-07-16

## 2024-07-16 DIAGNOSIS — M48.061 SPINAL STENOSIS, LUMBAR REGION WITHOUT NEUROGENIC CLAUDICATION: Primary | ICD-10-CM

## 2024-07-16 DIAGNOSIS — M54.16 LUMBAR RADICULOPATHY: ICD-10-CM

## 2024-07-16 RX ORDER — HYDROCODONE BITARTRATE AND ACETAMINOPHEN 5; 325 MG/1; MG/1
1 TABLET ORAL 3 TIMES DAILY PRN
Qty: 30 TABLET | Refills: 0 | Status: SHIPPED | OUTPATIENT
Start: 2024-07-16 | End: 2024-07-16

## 2024-07-16 RX ORDER — TRIAMCINOLONE ACETONIDE 40 MG/ML
40 INJECTION, SUSPENSION INTRA-ARTICULAR; INTRAMUSCULAR ONCE
Status: DISCONTINUED | OUTPATIENT
Start: 2024-07-16 | End: 2024-07-16

## 2024-07-16 RX ORDER — HYDROCODONE BITARTRATE AND ACETAMINOPHEN 5; 325 MG/1; MG/1
1 TABLET ORAL 3 TIMES DAILY PRN
Qty: 30 TABLET | Refills: 0 | Status: SHIPPED | OUTPATIENT
Start: 2024-07-16 | End: 2024-07-26

## 2024-07-16 NOTE — PROGRESS NOTES
Patient presented today for planned lumbar injection but is scheduled for lumbar surgery with Dr. Ordoñez in a month.  After contacting her office today, we did not proceed with lumbar injections.

## 2024-10-30 ENCOUNTER — TELEPHONE (OUTPATIENT)
Dept: ORTHOPEDIC SURGERY | Age: 59
End: 2024-10-30

## 2024-11-20 ENCOUNTER — OFFICE VISIT (OUTPATIENT)
Dept: ORTHOPEDIC SURGERY | Age: 59
End: 2024-11-20
Payer: COMMERCIAL

## 2024-11-20 VITALS — WEIGHT: 219 LBS | HEIGHT: 67 IN | BODY MASS INDEX: 34.37 KG/M2

## 2024-11-20 DIAGNOSIS — Z96.652 HISTORY OF TOTAL KNEE ARTHROPLASTY, LEFT: ICD-10-CM

## 2024-11-20 DIAGNOSIS — M25.562 ACUTE PAIN OF LEFT KNEE: Primary | ICD-10-CM

## 2024-11-20 PROCEDURE — 99203 OFFICE O/P NEW LOW 30 MIN: CPT | Performed by: PHYSICIAN ASSISTANT

## 2024-11-20 NOTE — PROGRESS NOTES
Name: Carlos Ruiz  YOB: 1965  Gender: male  MRN: [id number]      Chief complaint:  LEFT KNEE PAIN    History of Present Illness:     Carlos Ruiz is a 59 y.o. male  Patient returns today for their left total knee performed 8 years ago.  Over the past few months they have noticed increased pain and swelling over the knee.  He does note that he did have spine surgery in August by Dr. Ordoñez, and since the surgery and has had a foot drop on the right lower extremity since.  This is gradually resolving, but this is really taking time.  This is causing more strain and demand to be put on his left lower extremity.  They deny any fall or injury that may have caused this pain.  They have tried ice, activity modification, Tylenol, anti-inflammatories and have not helped with her symptoms.  They deny any fever, chills or malaise today.  At this time they are walking without any assistive device.    Past Medical History:    Allergies:  Allergies   Allergen Reactions    Celecoxib Other (See Comments)     Pt states \" caused blood in stools\"    Penicillins Other (See Comments)     \"reaction as child\"       Medications:  Current Outpatient Medications   Medication Sig    diclofenac sodium (VOLTAREN) 1 % GEL Apply 4g to the affected area 4 times daily.    atenolol-chlorthalidone (TENORETIC) 50-25 MG per tablet Take 1 tablet by mouth every evening    losartan (COZAAR) 100 MG tablet TAKE ONE TABLET EVERY DAY    metroNIDAZOLE (METROCREAM) 0.75 % cream APPLY EVERY DAY TO THE FACE FOR ROSACEA    naproxen sodium (ANAPROX) 220 MG tablet Take 440 mg by mouth as needed     No current facility-administered medications for this visit.       Past Medical history:  Past Medical History:   Diagnosis Date    Hypertension     managed with meds    Obesity (BMI 30.0-34.9) 11/16/2016    BMI 33    Osteoarthritis     knees, shoulders    White coat syndrome without diagnosis of hypertension         has a past surgical history that

## 2024-12-04 ENCOUNTER — OFFICE VISIT (OUTPATIENT)
Dept: ORTHOPEDIC SURGERY | Age: 59
End: 2024-12-04
Payer: COMMERCIAL

## 2024-12-04 VITALS — HEIGHT: 67 IN | WEIGHT: 219 LBS | BODY MASS INDEX: 34.37 KG/M2

## 2024-12-04 DIAGNOSIS — M48.062 SPINAL STENOSIS OF LUMBAR REGION WITH NEUROGENIC CLAUDICATION: Primary | ICD-10-CM

## 2024-12-04 PROCEDURE — 99214 OFFICE O/P EST MOD 30 MIN: CPT | Performed by: PHYSICIAN ASSISTANT

## 2024-12-04 NOTE — PROGRESS NOTES
Name: Carlos Ruiz  YOB: 1965  Gender: male  MRN: [id number]         *ANNUAL VISIT *        CC:   Chief Complaint   Patient presents with    New Patient     Pt is here today for lumbar spine pain. Would like to discuss possible injection         HPI:   Carlos Ruiz is a 59 y.o. male with  PMHx below.  There are an established  patient of Dr. Lal, and present here for Annual Visit.        Patient last underwent right L5/nerve root block with Dr. Lal 5/15/2024 he had greater than 80% improvement in his back and leg pain for about 6 weeks.  He subsequently underwent L4-5 laminectomy with Dr. Ordoñez neurosurgeon on 8/13/2024.  Prior to his laminectomy in August he was having severe constant back pain and right leg pain with some evidence of left foot drop and numbness and tingling in his legs and feet.  Most of his symptoms resolved after the laminectomy surgery in August, over the last several weeks he has been dealing with residual right buttock and thigh pain down to the lower leg.  It is not as severe as it was prior to surgery, but it is still affecting his ability to stand and walk for prolonged periods of time.  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  Riding in cars for long periods of time is quite painful for him.  He is not having significant numbness or tingling in his legs or feet anymore.  His weakness in his left foot has gotten notably better he reports.  He is continued his directed home exercise program daily since 9/25/2024.  His pain level at times reaches a 7 out of 10 when doing these activities listed above.  He has been taking over-the-counter anti-inflammatories.              Past Medical History Includes:   Past Medical History:   Diagnosis Date    Hypertension     managed with meds    Obesity (BMI 30.0-34.9) 11/16/2016    BMI 33    Osteoarthritis     knees, shoulders    White coat syndrome without diagnosis of hypertension    ,   Past Surgical

## 2024-12-16 ENCOUNTER — OFFICE VISIT (OUTPATIENT)
Dept: ORTHOPEDIC SURGERY | Age: 59
End: 2024-12-16
Payer: COMMERCIAL

## 2024-12-16 DIAGNOSIS — M21.372 LEFT FOOT DROP: ICD-10-CM

## 2024-12-16 DIAGNOSIS — M48.062 SPINAL STENOSIS OF LUMBAR REGION WITH NEUROGENIC CLAUDICATION: Primary | ICD-10-CM

## 2024-12-16 PROCEDURE — 64483 NJX AA&/STRD TFRM EPI L/S 1: CPT | Performed by: PHYSICAL MEDICINE & REHABILITATION

## 2024-12-16 RX ORDER — TRIAMCINOLONE ACETONIDE 40 MG/ML
40 INJECTION, SUSPENSION INTRA-ARTICULAR; INTRAMUSCULAR ONCE
Status: COMPLETED | OUTPATIENT
Start: 2024-12-16 | End: 2024-12-16

## 2024-12-16 RX ADMIN — TRIAMCINOLONE ACETONIDE 40 MG: 40 INJECTION, SUSPENSION INTRA-ARTICULAR; INTRAMUSCULAR at 10:25

## 2024-12-16 NOTE — PROGRESS NOTES
Name: Carlos Ruiz  YOB: 1965  Gender: male  MRN: [id number]        Transforaminal [facility] Procedure Note    Procedure: Right  L5-S1 transforaminal epidural steroid injections     Precautions: Carlos Ruiz denies prior sensitivity to steroid, local anesthetic, iodine, or shellfish.     He has left foot drop. We will prescribe a left AFO. In addition, we will obtain a new lumbar spine MRI to further evaluate his anatomy post-surgery.      Consent:  Consent was obtained prior to the procedure. The procedure was discussed at length with Carlos Ruiz. He was given the opportunity to ask questions regarding the procedure and its associated risks.  In addition to the potential risks associated with the procedure itself, the patient was informed both verbally and in writing of potential side effects of the use glucocorticoids.  The patient appeared to comprehend the informed consent and desired to have the procedure performed, and informed consent was signed.     He was placed in a prone position on the fluoroscopy table and the skin was prepped and draped in a routine sterile fashion. The areas to be injected were each anesthetized with 1 ml of 1% Lidocaine. A 22 gauge 3.5 inch spinal needle was carefully advanced under fluoroscopic guidance to the right L5-S1 transforaminal space  0.5 ml of 70% of Omnipaque was injected to confirm proper needle placement and absence of subdural or vascular flow Once proper placement was confirmed, 0.5ml of 0.25 marcaine and 40 mg kenalog were injected through the spinal needle.     Fluoroscopic guidance was used intermittently over a 10-minute period to insure proper needle placement and his safety. A hard copy of the fluoroscopic image has been placed in his chart and is saved on the C-arm hard drive. He was monitored for 30 minutes after the procedure and discharged home in a stable fashion with a routine follow up.    Procedural Diagnosis:     ICD-10-CM

## 2025-01-02 ENCOUNTER — TELEPHONE (OUTPATIENT)
Dept: ORTHOPEDIC SURGERY | Age: 60
End: 2025-01-02

## 2025-01-16 ENCOUNTER — HOSPITAL ENCOUNTER (OUTPATIENT)
Age: 60
Discharge: HOME | End: 2025-01-18
Payer: COMMERCIAL

## 2025-01-16 DIAGNOSIS — M48.062 SPINAL STENOSIS OF LUMBAR REGION WITH NEUROGENIC CLAUDICATION: ICD-10-CM

## 2025-01-16 DIAGNOSIS — M21.372 LEFT FOOT DROP: ICD-10-CM

## 2025-01-16 PROCEDURE — 72158 MRI LUMBAR SPINE W/O & W/DYE: CPT

## 2025-01-16 PROCEDURE — A9579 GAD-BASE MR CONTRAST NOS,1ML: HCPCS | Performed by: PHYSICAL MEDICINE & REHABILITATION

## 2025-01-16 PROCEDURE — 6360000004 HC RX CONTRAST MEDICATION: Performed by: PHYSICAL MEDICINE & REHABILITATION

## 2025-01-16 RX ADMIN — GADOTERIDOL 20 ML: 279.3 INJECTION, SOLUTION INTRAVENOUS at 09:28

## 2025-01-17 DIAGNOSIS — M48.062 SPINAL STENOSIS OF LUMBAR REGION WITH NEUROGENIC CLAUDICATION: Primary | ICD-10-CM

## 2025-01-20 ENCOUNTER — HOSPITAL ENCOUNTER (OUTPATIENT)
Age: 60
Discharge: HOME | End: 2025-01-22
Attending: PHYSICAL MEDICINE & REHABILITATION
Payer: COMMERCIAL

## 2025-01-20 ENCOUNTER — OFFICE VISIT (OUTPATIENT)
Dept: ORTHOPEDIC SURGERY | Age: 60
End: 2025-01-20
Payer: COMMERCIAL

## 2025-01-20 DIAGNOSIS — M48.02 SPINAL STENOSIS IN CERVICAL REGION: ICD-10-CM

## 2025-01-20 DIAGNOSIS — M48.062 SPINAL STENOSIS OF LUMBAR REGION WITH NEUROGENIC CLAUDICATION: Primary | ICD-10-CM

## 2025-01-20 DIAGNOSIS — M48.062 SPINAL STENOSIS OF LUMBAR REGION WITH NEUROGENIC CLAUDICATION: ICD-10-CM

## 2025-01-20 PROCEDURE — 64484 NJX AA&/STRD TFRM EPI L/S EA: CPT | Performed by: PHYSICAL MEDICINE & REHABILITATION

## 2025-01-20 PROCEDURE — 72141 MRI NECK SPINE W/O DYE: CPT

## 2025-01-20 PROCEDURE — 64483 NJX AA&/STRD TFRM EPI L/S 1: CPT | Performed by: PHYSICAL MEDICINE & REHABILITATION

## 2025-01-20 RX ORDER — TRIAMCINOLONE ACETONIDE 40 MG/ML
40 INJECTION, SUSPENSION INTRA-ARTICULAR; INTRAMUSCULAR ONCE
Status: COMPLETED | OUTPATIENT
Start: 2025-01-20 | End: 2025-01-20

## 2025-01-20 RX ADMIN — TRIAMCINOLONE ACETONIDE 40 MG: 40 INJECTION, SUSPENSION INTRA-ARTICULAR; INTRAMUSCULAR at 13:58

## 2025-01-20 NOTE — PROGRESS NOTES
fluoroscopic image has been placed in his chart and is saved on the C-arm hard drive. He was monitored for 30 minutes after the procedure and discharged home in a stable fashion with a routine follow up.    Procedural Diagnosis:     ICD-10-CM    1. Spinal stenosis of lumbar region with neurogenic claudication  M48.062 FL NERVE BLOCK LUMBOSACRAL 1ST     FL NERVE BLOCK LUMBOSACRAL EACH ADD     triamcinolone acetonide (KENALOG-40) injection 40 mg     MRI CERVICAL SPINE WO CONTRAST      2. Spinal stenosis in cervical region  M48.02 MRI CERVICAL SPINE WO CONTRAST         ZULEMA TEMPLETON MD  01/20/25

## 2025-01-23 ENCOUNTER — TELEPHONE (OUTPATIENT)
Dept: ORTHOPEDIC SURGERY | Age: 60
End: 2025-01-23

## 2025-01-23 NOTE — TELEPHONE ENCOUNTER
He has tried to call [facility] back but has not been able to get her on the numbers she left. Please try him at [phone].     Spoke with patient by telephone and discussed significant cervical spine MRI findings.  He would like to seek consultation with neurosurgery, and we will make that referral.  He will  a copy of his cervical spine MRI images prior to that appointment.

## 2025-01-24 DIAGNOSIS — M48.02 SPINAL STENOSIS IN CERVICAL REGION: Primary | ICD-10-CM

## 2025-02-10 ENCOUNTER — TELEPHONE (OUTPATIENT)
Dept: ORTHOPEDIC SURGERY | Age: 60
End: 2025-02-10

## 2025-02-10 DIAGNOSIS — M48.062 SPINAL STENOSIS OF LUMBAR REGION WITH NEUROGENIC CLAUDICATION: Primary | ICD-10-CM

## 2025-02-10 DIAGNOSIS — M54.16 LUMBAR RADICULOPATHY: ICD-10-CM

## 2025-02-10 NOTE — TELEPHONE ENCOUNTER
Returned call to patient and he states that the last injection did provide relief, but is starting to have pain again. He would like to go ahead and schedule a injection with Dr. Lal. Appointment was scheduled.

## 2025-02-13 ENCOUNTER — OFFICE VISIT (OUTPATIENT)
Dept: ORTHOPEDIC SURGERY | Age: 60
End: 2025-02-13

## 2025-02-13 DIAGNOSIS — M54.16 LUMBAR RADICULOPATHY: ICD-10-CM

## 2025-02-13 DIAGNOSIS — M48.062 SPINAL STENOSIS OF LUMBAR REGION WITH NEUROGENIC CLAUDICATION: Primary | ICD-10-CM

## 2025-02-13 RX ORDER — TRIAMCINOLONE ACETONIDE 40 MG/ML
40 INJECTION, SUSPENSION INTRA-ARTICULAR; INTRAMUSCULAR ONCE
Status: COMPLETED | OUTPATIENT
Start: 2025-02-13 | End: 2025-02-13

## 2025-02-13 RX ADMIN — TRIAMCINOLONE ACETONIDE 40 MG: 40 INJECTION, SUSPENSION INTRA-ARTICULAR; INTRAMUSCULAR at 13:30

## 2025-02-13 NOTE — PROGRESS NOTES
Name: Carlos Ruiz  YOB: 1965  Gender: male  MRN: [id number]        Transforaminal [facility] Procedure Note    Procedure: Right  L4-L5 and L5-S1 transforaminal epidural steroid injections     Precautions: Carlos Ruiz denies prior sensitivity to steroid, local anesthetic, iodine, or shellfish.       Consent:  Consent was obtained prior to the procedure. The procedure was discussed at length with Carlos Ruiz. He was given the opportunity to ask questions regarding the procedure and its associated risks.  In addition to the potential risks associated with the procedure itself, the patient was informed both verbally and in writing of potential side effects of the use glucocorticoids.  The patient appeared to comprehend the informed consent and desired to have the procedure performed, and informed consent was signed.     He was placed in a prone position on the fluoroscopy table and the skin was prepped and draped in a routine sterile fashion. The areas to be injected were each anesthetized with 1 ml of 1% Lidocaine. A 22 gauge 3.5 inch spinal needle was carefully advanced under fluoroscopic guidance to the right L5-S1 transforaminal space  0.5 ml of 70% of Omnipaque was injected to confirm proper needle placement and absence of subdural or vascular flow Once proper placement was confirmed, 0.5ml of 0.25 marcaine and 40 mg kenalog were injected through the spinal needle.       The above procedure was then repeated at the Right  L4-L5 transforaminal space.    Fluoroscopic guidance was used intermittently over a 10-minute period to insure proper needle placement and his safety. A hard copy of the fluoroscopic image has been placed in his chart and is saved on the C-arm hard drive. He was monitored for 30 minutes after the procedure and discharged home in a stable fashion with a routine follow up.    Procedural Diagnosis:     ICD-10-CM    1. Spinal stenosis of lumbar region with neurogenic

## 2025-05-14 ENCOUNTER — TELEPHONE (OUTPATIENT)
Dept: ORTHOPEDIC SURGERY | Age: 60
End: 2025-05-14

## 2025-05-14 DIAGNOSIS — M54.16 LUMBAR RADICULOPATHY: ICD-10-CM

## 2025-05-14 DIAGNOSIS — M48.062 SPINAL STENOSIS OF LUMBAR REGION WITH NEUROGENIC CLAUDICATION: Primary | ICD-10-CM

## 2025-05-15 NOTE — TELEPHONE ENCOUNTER
Returned call to patient and he states that since surgery he has been having a lot of sciatic nerve pain on the right and would like to get a injection. He states that the pain starts at the lower back going into the back of the thigh into the calf.

## 2025-05-22 ENCOUNTER — OFFICE VISIT (OUTPATIENT)
Dept: ORTHOPEDIC SURGERY | Age: 60
End: 2025-05-22

## 2025-05-22 DIAGNOSIS — M48.062 SPINAL STENOSIS OF LUMBAR REGION WITH NEUROGENIC CLAUDICATION: Primary | ICD-10-CM

## 2025-05-22 RX ORDER — TRIAMCINOLONE ACETONIDE 40 MG/ML
40 INJECTION, SUSPENSION INTRA-ARTICULAR; INTRAMUSCULAR ONCE
Status: COMPLETED | OUTPATIENT
Start: 2025-05-22 | End: 2025-05-22

## 2025-05-22 RX ADMIN — TRIAMCINOLONE ACETONIDE 40 MG: 40 INJECTION, SUSPENSION INTRA-ARTICULAR; INTRAMUSCULAR at 14:43

## 2025-05-22 NOTE — PROGRESS NOTES
Name: Carlos Ruiz  YOB: 1965  Gender: male  MRN: [id number]        Transforaminal [facility] Procedure Note    Procedure: Right  L4-L5 and L5-S1 transforaminal epidural steroid injections     Precautions: Carlos Ruiz denies prior sensitivity to steroid, local anesthetic, iodine, or shellfish.       Consent:  Consent was obtained prior to the procedure. The procedure was discussed at length with Carlos Ruiz. He was given the opportunity to ask questions regarding the procedure and its associated risks.  In addition to the potential risks associated with the procedure itself, the patient was informed both verbally and in writing of potential side effects of the use glucocorticoids.  The patient appeared to comprehend the informed consent and desired to have the procedure performed, and informed consent was signed.     He was placed in a prone position on the fluoroscopy table and the skin was prepped and draped in a routine sterile fashion. The areas to be injected were each anesthetized with 1 ml of 1% Lidocaine. A 22 gauge 3.5 inch spinal needle was carefully advanced under fluoroscopic guidance to the right L5-S1 transforaminal space  0.5 ml of 70% of Omnipaque was injected to confirm proper needle placement and absence of subdural or vascular flow Once proper placement was confirmed, 0.5ml of 0.25 marcaine and 40 mg kenalog were injected through the spinal needle.       The above procedure was then repeated at the Right  L4-L5 transforaminal space.    Fluoroscopic guidance was used intermittently over a 10-minute period to insure proper needle placement and his safety. A hard copy of the fluoroscopic image has been placed in his chart and is saved on the C-arm hard drive. He was monitored after the procedure and discharged home in a stable fashion with a routine follow up.    Procedural Diagnosis:     ICD-10-CM    1. Spinal stenosis of lumbar region with neurogenic claudication

## 2025-06-18 DIAGNOSIS — M54.16 LUMBAR RADICULOPATHY: ICD-10-CM

## 2025-06-18 DIAGNOSIS — M48.062 SPINAL STENOSIS OF LUMBAR REGION WITH NEUROGENIC CLAUDICATION: Primary | ICD-10-CM

## 2025-06-23 ENCOUNTER — TELEPHONE (OUTPATIENT)
Dept: ORTHOPEDIC SURGERY | Age: 60
End: 2025-06-23

## 2025-06-23 NOTE — TELEPHONE ENCOUNTER
----- Message from Janna C sent at 6/23/2025 11:10 AM EDT -----  Regarding: Lukasz  Specialty Message to Provider  Specialty Message to Provider    Relationship to Patient: Self     Patient Message: please call Mother of patient , she has some questions  --------------------------------------------------------------------------------------------------------------------------    Call Back Information: OK to leave message on voicemail  Preferred Call Back Number: Phone  [phone]

## 2025-06-23 NOTE — TELEPHONE ENCOUNTER
Informed that insurance has not approved the MRI scheduled on 6/24/25.  Left patient a voice message informing him that appointment for Lumbar spine appointment has been canceled because his insurance has not yet approved it.  I informed him that once we receive a decision from his insurance, someone will call him back.  Informed him to call if he has any questions.

## 2025-08-05 ENCOUNTER — OFFICE VISIT (OUTPATIENT)
Dept: ORTHOPEDIC SURGERY | Age: 60
End: 2025-08-05
Payer: COMMERCIAL

## 2025-08-05 VITALS — BODY MASS INDEX: 33.27 KG/M2 | HEIGHT: 67 IN | WEIGHT: 212 LBS

## 2025-08-05 DIAGNOSIS — M51.362 DEGENERATION OF INTERVERTEBRAL DISC OF LUMBAR REGION WITH DISCOGENIC BACK PAIN AND LOWER EXTREMITY PAIN: ICD-10-CM

## 2025-08-05 DIAGNOSIS — M54.16 LUMBAR RADICULOPATHY: Primary | ICD-10-CM

## 2025-08-05 DIAGNOSIS — M47.816 LUMBAR SPONDYLOSIS: ICD-10-CM

## 2025-08-05 PROCEDURE — 99214 OFFICE O/P EST MOD 30 MIN: CPT | Performed by: PHYSICAL MEDICINE & REHABILITATION

## 2025-08-05 RX ORDER — GABAPENTIN 300 MG/1
300 CAPSULE ORAL 3 TIMES DAILY
COMMUNITY
Start: 2025-06-30

## 2025-08-13 DIAGNOSIS — M48.062 SPINAL STENOSIS OF LUMBAR REGION WITH NEUROGENIC CLAUDICATION: ICD-10-CM

## 2025-08-13 DIAGNOSIS — M47.816 LUMBAR SPONDYLOSIS: ICD-10-CM

## 2025-08-13 DIAGNOSIS — M54.16 LUMBAR RADICULOPATHY: Primary | ICD-10-CM

## 2025-08-14 ENCOUNTER — OFFICE VISIT (OUTPATIENT)
Dept: ORTHOPEDIC SURGERY | Age: 60
End: 2025-08-14

## 2025-08-14 DIAGNOSIS — M48.062 SPINAL STENOSIS OF LUMBAR REGION WITH NEUROGENIC CLAUDICATION: Primary | ICD-10-CM

## 2025-08-14 RX ORDER — TRIAMCINOLONE ACETONIDE 40 MG/ML
40 INJECTION, SUSPENSION INTRA-ARTICULAR; INTRAMUSCULAR ONCE
Status: COMPLETED | OUTPATIENT
Start: 2025-08-14 | End: 2025-08-14

## 2025-08-14 RX ADMIN — TRIAMCINOLONE ACETONIDE 40 MG: 40 INJECTION, SUSPENSION INTRA-ARTICULAR; INTRAMUSCULAR at 10:59
